# Patient Record
Sex: FEMALE | Race: BLACK OR AFRICAN AMERICAN | Employment: FULL TIME | URBAN - METROPOLITAN AREA
[De-identification: names, ages, dates, MRNs, and addresses within clinical notes are randomized per-mention and may not be internally consistent; named-entity substitution may affect disease eponyms.]

---

## 2017-02-27 ENCOUNTER — HOSPITAL ENCOUNTER (EMERGENCY)
Age: 27
Discharge: HOME | End: 2017-02-27
Attending: EMERGENCY MEDICINE | Admitting: EMERGENCY MEDICINE
Payer: COMMERCIAL

## 2017-02-27 VITALS
BODY MASS INDEX: 32.02 KG/M2 | OXYGEN SATURATION: 99 % | HEIGHT: 67 IN | WEIGHT: 204 LBS | TEMPERATURE: 98.5 F | RESPIRATION RATE: 18 BRPM | SYSTOLIC BLOOD PRESSURE: 128 MMHG | DIASTOLIC BLOOD PRESSURE: 76 MMHG | HEART RATE: 68 BPM

## 2017-02-27 DIAGNOSIS — S61.219A LACERATION OF FINGER, INITIAL ENCOUNTER: Primary | ICD-10-CM

## 2017-02-27 PROCEDURE — 74011000250 HC RX REV CODE- 250: Performed by: EMERGENCY MEDICINE

## 2017-02-27 PROCEDURE — 77030018836 HC SOL IRR NACL ICUM -A

## 2017-02-27 PROCEDURE — 99282 EMERGENCY DEPT VISIT SF MDM: CPT

## 2017-02-27 PROCEDURE — 75810000293 HC SIMP/SUPERF WND  RPR

## 2017-02-27 PROCEDURE — 77030031132 HC SUT NYL COVD -A

## 2017-02-27 RX ORDER — LIDOCAINE HYDROCHLORIDE 20 MG/ML
10 INJECTION, SOLUTION INFILTRATION; PERINEURAL ONCE
Status: COMPLETED | OUTPATIENT
Start: 2017-02-27 | End: 2017-02-27

## 2017-02-27 RX ADMIN — LIDOCAINE HYDROCHLORIDE 200 MG: 20 INJECTION, SOLUTION INFILTRATION; PERINEURAL at 22:08

## 2017-02-28 NOTE — DISCHARGE INSTRUCTIONS
Cuts Closed With Stitches: Care Instructions  Your Care Instructions  A cut can happen anywhere on your body. The doctor used stitches to close the cut. Using stitches also helps the cut heal and reduces scarring. Sometimes pieces of tape called Steri-Strips are put over the stitches. If the cut went deep and through the skin, the doctor may have put in two layers of stitches. The deeper layer brings the deep part of the cut together. These stitches will dissolve and don't need to be removed. The stitches in the upper layer are the ones you see on the cut. You will probably have a bandage over the stitches. You will need to have the stitches removed, usually in 10 to 14 days. The doctor has checked you carefully, but problems can develop later. If you notice any problems or new symptoms, get medical treatment right away. Follow-up care is a key part of your treatment and safety. Be sure to make and go to all appointments, and call your doctor if you are having problems. It's also a good idea to know your test results and keep a list of the medicines you take. How can you care for yourself at home? · Keep the cut dry for the first 24 to 48 hours. After this, you can shower if your doctor okays it. Pat the cut dry. · Don't soak the cut, such as in a bathtub. Your doctor will tell you when it's safe to get the cut wet. · If your doctor told you how to care for your cut, follow your doctor's instructions. If you did not get instructions, follow this general advice:  ¨ After the first 24 to 48 hours, wash around the cut with clean water 2 times a day. Don't use hydrogen peroxide or alcohol, which can slow healing. ¨ You may cover the cut with a thin layer of petroleum jelly, such as Vaseline, and a nonstick bandage. ¨ Apply more petroleum jelly and replace the bandage as needed. · Prop up the sore area on a pillow anytime you sit or lie down during the next 3 days.  Try to keep it above the level of your heart. This will help reduce swelling. · Avoid any activity that could cause your cut to reopen. · Do not remove the stitches on your own. Your doctor will tell you when to come back to have the stitches removed. · Leave Steri-Strips on until they fall off. · Be safe with medicines. Read and follow all instructions on the label. ¨ If the doctor gave you a prescription medicine for pain, take it as prescribed. ¨ If you are not taking a prescription pain medicine, ask your doctor if you can take an over-the-counter medicine. When should you call for help? Call your doctor now or seek immediate medical care if:  · You have new pain, or your pain gets worse. · The skin near the cut is cold or pale or changes color. · You have tingling, weakness, or numbness near the cut. · The cut starts to bleed, and blood soaks through the bandage. Oozing small amounts of blood is normal.  · You have trouble moving the area near the cut. · You have symptoms of infection, such as:  ¨ Increased pain, swelling, warmth, or redness around the cut. ¨ Red streaks leading from the cut. ¨ Pus draining from the cut. ¨ A fever. Watch closely for changes in your health, and be sure to contact your doctor if:  · The cut reopens. · You do not get better as expected. Where can you learn more? Go to http://danelle-pk.info/. Enter R217 in the search box to learn more about \"Cuts Closed With Stitches: Care Instructions. \"  Current as of: May 27, 2016  Content Version: 11.1  © 9335-7618 [employer]. Care instructions adapted under license by [employer] (which disclaims liability or warranty for this information). If you have questions about a medical condition or this instruction, always ask your healthcare professional. [address] any warranty or liability for your use of this information.

## 2017-02-28 NOTE — ED NOTES
Discharge Instructions Reviewed with patient per this nurse. Discharge instructions given to patient per this nurse. Patient able to return verbalize discharge instructions. Paper copy of discharge instructions given. No RX given. Patient condition stable, Respiratory status WNL, Neurostatus intact. Ambulatory out of er, to home with sig other.

## 2017-02-28 NOTE — ED PROVIDER NOTES
HPI Comments: Lashae Ng is a 23 y.o. female with PMHx significant for anxiety,psoriasis who presents ambulatory to the ED with cc of laceration to the left thumb x today, rated a constant 7/10 in terms of pain. She did not specify how she got the laceration. Pt notes that her Tetanus shot is UTD (within the last year) because she needed one for work and school. She notes a h/o psoriasis and reports that she used steroids to control it \"years ago\" but denies having used anything recently. She reports her period started today. She specifically denies any fevers, chills, nausea, vomiting, chest pain, shortness of breath, headache,  diarrhea,. PCP: Brandon Watson PA-C    Social hx: smoking (-) EtOH (-)    There are no other complaints, changes, or physical findings at this time. The history is provided by the patient. Past Medical History:   Diagnosis Date    ADD (attention deficit disorder)     Allergic rhinitis     Anxiety     Chronic UTI     Hearing loss     dx as a child - never treated. No past surgical history on file. 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 Son is 2 yo in 2015. Lives with son and son's father - dating x 6 years in 2015.           ALLERGIES: Review of patient's allergies indicates no known allergies. Review of Systems   Constitutional: Negative for appetite change, chills and fever. HENT: Negative for congestion. Eyes: Negative for visual disturbance. Respiratory: Negative for cough, shortness of breath and wheezing. Cardiovascular: Negative for chest pain, palpitations and leg swelling. Gastrointestinal: Negative for abdominal pain. Genitourinary: Negative for dysuria, frequency and urgency. Musculoskeletal: Negative for back pain, joint swelling, myalgias and neck stiffness. Skin: Positive for wound. Negative for rash. Neurological: Negative for dizziness, syncope, weakness and headaches. Hematological: Negative for adenopathy. Psychiatric/Behavioral: Negative for behavioral problems and dysphoric mood. Vitals:    02/27/17 2151   BP: 128/76   Pulse: 68   Resp: 18   Temp: 98.5 °F (36.9 °C)   SpO2: 99%   Weight: 92.5 kg (204 lb)   Height: 5' 7\" (1.702 m)            Physical Exam   Constitutional: She is oriented to person, place, and time. She appears well-developed and well-nourished. No distress. HENT:   Head: Normocephalic and atraumatic. Mouth/Throat: Oropharynx is clear and moist. No oropharyngeal exudate or posterior oropharyngeal erythema. Neck: Normal range of motion and full passive range of motion without pain. Neck supple. Cardiovascular: Normal rate, regular rhythm, normal heart sounds, intact distal pulses and normal pulses. Exam reveals no gallop and no friction rub. No murmur heard. Pulmonary/Chest: Effort normal and breath sounds normal. No accessory muscle usage. No respiratory distress. She has no decreased breath sounds. She has no wheezes. She has no rhonchi. She has no rales. Abdominal: Soft. Bowel sounds are normal. She exhibits no distension. There is no tenderness. There is no rebound, no guarding and no CVA tenderness. Musculoskeletal: Normal range of motion.  She exhibits no edema or tenderness. Thoracic back: She exhibits no tenderness and no bony tenderness. Lumbar back: She exhibits no tenderness and no bony tenderness. Lymphadenopathy:     She has no cervical adenopathy. Neurological: She is alert and oriented to person, place, and time. She has normal strength. She is not disoriented. No cranial nerve deficit or sensory deficit. No focal deficits; 5/5 muscle strength in all extremities   Skin: Skin is warm. No lesion and no rash noted. Rash is not nodular. She is not diaphoretic. 1.6 cm curvilinear laceration 2-3 mm deep   Nursing note and vitals reviewed. MDM  Number of Diagnoses or Management Options  Diagnosis management comments: Ddx: laceration       Amount and/or Complexity of Data Reviewed  Review and summarize past medical records: yes    Patient Progress  Patient progress: stable    ED Course       Procedures  Procedure Note - Laceration Repair:  10:14 PM  Procedure by Meagan Haddad MD  Complexity: Simple  1.6 cm curvilinear to left thumb  was irrigated copiously with NS under jet lavage, prepped with saline and draped in a sterile fashion. The area was anesthetized with 3  mLs of  Lidocaine 2% without epinephrine via local infiltration. The wound was explored with the following results: No foreign bodies found. The wound was repaired with One layer suture closure: Skin Layer:  5 sutures placed, stitch type:simple interrupted, suture: 5-0 nylon. .  The wound was closed with good hemostasis and approximation. Sterile dressing applied. Estimated blood loss: .5 mL  The procedure took 1-15 minutes, and pt tolerated well. Written by Linda Nath ED Scribe, as dictated by Meagan Haddad MD.          MEDICATIONS GIVEN:  Medications   lidocaine (XYLOCAINE) 20 mg/mL (2 %) injection 200 mg (200 mg IntraDERMal Given by Provider 2/27/17 1628)       IMPRESSION:  1. Laceration of finger, initial encounter        PLAN:  1.    Current Discharge Medication List      CONTINUE these medications which have NOT CHANGED    Details   levonorgestrel (MIRENA) 20 mcg/24 hr (5 years) IUD 1 Each by IntraUTERine route once. methylphenidate (RITALIN) 10 mg tablet 10 mg. FLUoxetine (PROZAC) 10 mg capsule Take 10 mg by mouth three (3) times daily. LORazepam (ATIVAN) 0.5 mg tablet Take  by mouth. 2.   Follow-up Information     Follow up With Details Comments [address], PA-C   [address] [facility] [address] [phone]          Return to ED if worse     DISCHARGE NOTE  10:24 PM  The patient has been re-evaluated and is ready for discharge. Reviewed available results with patient. Counseled pt on diagnosis and care plan. Pt has expressed understanding, and all questions have been answered. Pt agrees with plan and agrees to F/U as recommended, or return to the ED if their sxs worsen. Discharge instructions have been provided and explained to the pt, along with reasons to return to the ED. Written by Desirae Holloway, ED Scribe, as dictated by Rene Rankin MD.    This note is prepared by Desirae Holloway, acting as Scribe for Rene Rankin MD.    Rene Rankin MD: The scribe's documentation has been prepared under my direction and personally reviewed by me in its entirety. I confirm that the note above accurately reflects all work, treatment, procedures, and medical decision making performed by me.

## 2017-02-28 NOTE — ED NOTES
Emergency Department Nursing Plan of Care       The Nursing Plan of Care is developed from the Nursing assessment and Emergency Department Attending provider initial evaluation. The plan of care may be reviewed in the ED Provider note.     The Plan of Care was developed with the following considerations:   Patient / Family readiness to learn indicated by:verbalized understanding  Persons(s) to be included in education: patient  Barriers to Learning/Limitations:No    Signed     Yudelka Catalan RN    2/27/2017   10:14 PM

## 2017-03-13 ENCOUNTER — OFFICE VISIT (OUTPATIENT)
Dept: INTERNAL MEDICINE CLINIC | Facility: CLINIC | Age: 27
End: 2017-03-13

## 2017-03-13 VITALS
BODY MASS INDEX: 31.86 KG/M2 | WEIGHT: 203 LBS | TEMPERATURE: 98.2 F | DIASTOLIC BLOOD PRESSURE: 61 MMHG | SYSTOLIC BLOOD PRESSURE: 127 MMHG | HEIGHT: 67 IN | RESPIRATION RATE: 18 BRPM | HEART RATE: 72 BPM

## 2017-03-13 DIAGNOSIS — Z77.21 EXPOSURE TO POTENTIALLY HAZARDOUS BODY FLUIDS: ICD-10-CM

## 2017-03-13 DIAGNOSIS — S61.012D LACERATION OF LEFT THUMB, SUBSEQUENT ENCOUNTER: Primary | ICD-10-CM

## 2017-03-13 NOTE — MR AVS SNAPSHOT
Visit Information Date & Time Provider Department Dept. Phone Encounter #  
 3/13/2017  1:15 PM Sp Zapata PA-C [facility] [phone] [id number] Follow-up Instructions Return if symptoms worsen or fail to improve. Upcoming Health Maintenance Date Due  
 PAP AKA CERVICAL CYTOLOGY 8/26/2012 HPV AGE 9Y-26Y (2 of 3 - Female 3 Dose Series) 12/28/2016 DTaP/Tdap/Td series (2 - Td) 8/31/2025 Allergies as of 3/13/2017  Review Complete On: 3/13/2017 By: Sp Zapata PA-C No Known Allergies Current Immunizations  Never Reviewed Name Date HPV (Quad) 11/2/2016 Tdap 8/31/2015 Not reviewed this visit You Were Diagnosed With   
  
 Codes Comments Laceration of left thumb, subsequent encounter    -  Primary ICD-10-CM: Z26.163V ICD-9-CM: V58.89, 883.0 Exposure to potentially hazardous body fluids     ICD-10-CM: Z77.21 
ICD-9-CM: V15.85 Vitals BP Pulse Temp Resp Height(growth percentile) Weight(growth percentile) 127/61 (BP 1 Location: Right arm, BP Patient Position: Sitting) 72 98.2 °F (36.8 °C) (Oral) 18 5' 7\" (1.702 m) 203 lb (92.1 kg) BMI OB Status Smoking Status 31.79 kg/m2 IUD Never Smoker Vitals History BMI and BSA Data Body Mass Index Body Surface Area 31.79 kg/m 2 2.09 m 2 Preferred Pharmacy Pharmacy Name Phone [facility] [address] [facility] [phone] Your Updated Medication List  
  
   
This list is accurate as of: 3/13/17  1:30 PM.  Always use your most recent med list.  
  
  
  
  
 LORazepam 0.5 mg tablet Commonly known as:  ATIVAN Take  by mouth.  
  
 methylphenidate 10 mg tablet Commonly known as:  RITALIN  
10 mg. MIRENA 20 mcg/24 hr (5 years) IUD Generic drug:  levonorgestrel 1 Each by IntraUTERine route once. PROzac 10 mg capsule Generic drug:  FLUoxetine Take 10 mg by mouth three (3) times daily. Follow-up Instructions Return if symptoms worsen or fail to improve. Introducing \[facility]\"" SERVICES! Lou Arndt introduces [employer] patient portal. Now you can access parts of your medical record, email your doctor's office, and request medication refills online. 1. In your internet browser, go to [email or web address] 2. Click on the First Time User? Click Here link in the Sign In box. You will see the New Member Sign Up page. 3. Enter your [employer] Access Code exactly as it appears below. You will not need to use this code after youve completed the sign-up process. If you do not sign up before the expiration date, you must request a new code. · [employer] Access Code: [email or web address] Expires: 5/28/2017 10:55 PM 
 
4. Enter the last four digits of your Social Security Number (xxxx) and Date of Birth (mm/dd/yyyy) as indicated and click Submit. You will be taken to the next sign-up page. 5. Create a [employer] ID. This will be your [employer] login ID and cannot be changed, so think of one that is secure and easy to remember. 6. Create a [employer] password. You can change your password at any time. 7. Enter your Password Reset Question and Answer. This can be used at a later time if you forget your password. 8. Enter your e-mail address. You will receive e-mail notification when new information is available in [address] 9. Click Sign Up. You can now view and download portions of your medical record. 10. Click the Download Summary menu link to download a portable copy of your medical information. If you have questions, please visit the Frequently Asked Questions section of the [employer] website. Remember, [employer] is NOT to be used for urgent needs. For medical emergencies, dial 911. Now available from your iPhone and Android! Please provide this summary of care documentation to your next provider. Your primary care clinician is listed as uSsannah Su. If you have any questions after today's visit, please call [phone].

## 2017-03-13 NOTE — PROGRESS NOTES
Chief Complaint   Patient presents with    Suture Removal     thumb on left hand. Sutures have come out on there own, would like the provider to look at area. .1. Have you been to the ER, urgent care clinic since your last visit? Hospitalized since your last visit? Yes When: 2/27/17 Where: [facility] Reason for visit: Cut thumb on left hand needed stitches. 2. Have you seen or consulted any other health care providers outside of the [address] [place] [address] since your last visit? Include any pap smears or colon screening.  No

## 2017-03-13 NOTE — PROGRESS NOTES
HISTORY OF PRESENT ILLNESS  Obed Prado is a 23 y.o. female. HPI  1) L thumb laceration - hand vs kitchen slicer 2 weeks ago. Healing. Stitches fell out and would like a check to be sure healing appropriately. TDAP done 2015.     2) Boyfriend tested positive for Herpes. Got results this morning. He is very upset. Pt has never had a herpes flare. She has tested positive on labs when pregnant with her last child (7 months old). Has been with boyfriend for ~ 7 years. Boyfriend had another partner last year, but none since then. Pt would like resources for more information. Review of Systems   Constitutional: Negative for fever. Skin: Negative for rash. Physical Exam   Constitutional: She is oriented to person, place, and time. She appears well-developed and well-nourished. No distress. HENT:   Head: Normocephalic and atraumatic. Neck: Neck supple. No JVD present. Cardiovascular: Normal rate, regular rhythm and normal heart sounds. Pulmonary/Chest: Effort normal and breath sounds normal. No respiratory distress. Musculoskeletal: She exhibits no edema. Neurological: She is alert and oriented to person, place, and time. Skin: Skin is warm and dry. L thumb with shallow laceration - no remaining sutures present. Healing nicely. Dry skin. Psychiatric: Her behavior is normal. Judgment and thought content normal.   Slightly tearful at times during history. Nursing note and vitals reviewed. ASSESSMENT and PLAN    ICD-10-CM ICD-9-CM    1. Laceration of left thumb, subsequent encounter S61.012D V58.89 Moisturize regularly. Reassured pt this is healing nicely. 883.0    2. Exposure to potentially hazardous body fluids Z77.21 V15.85 Discussed information on chronic HSV II and preventative measures.

## 2017-03-17 ENCOUNTER — CLINICAL SUPPORT (OUTPATIENT)
Dept: INTERNAL MEDICINE CLINIC | Facility: CLINIC | Age: 27
End: 2017-03-17

## 2017-03-17 DIAGNOSIS — Z23 ENCOUNTER FOR IMMUNIZATION: Primary | ICD-10-CM

## 2017-03-17 NOTE — PROGRESS NOTES
Patient in to receive second dose of HPV vaccine. Given in Right deltoid. Tolerated well. No adverse effects observed.  Third dose to be scheduled for [facility] July no sooner than [place]

## 2017-06-20 ENCOUNTER — CLINICAL SUPPORT (OUTPATIENT)
Dept: INTERNAL MEDICINE CLINIC | Facility: CLINIC | Age: 27
End: 2017-06-20

## 2017-06-20 DIAGNOSIS — Z23 ENCOUNTER FOR IMMUNIZATION: Primary | ICD-10-CM

## 2017-06-20 NOTE — PROGRESS NOTES
Patient in today for 3rd dose of HPV. Administered in right deltoid. Tolerated well. Patient observed for 10 minutes. No reactions noted. .  Patient to follow up as needed with provider.

## 2017-11-01 ENCOUNTER — OFFICE VISIT (OUTPATIENT)
Dept: INTERNAL MEDICINE CLINIC | Facility: CLINIC | Age: 27
End: 2017-11-01

## 2017-11-01 VITALS
HEIGHT: 67 IN | BODY MASS INDEX: 30.29 KG/M2 | SYSTOLIC BLOOD PRESSURE: 115 MMHG | WEIGHT: 193 LBS | HEART RATE: 73 BPM | RESPIRATION RATE: 18 BRPM | DIASTOLIC BLOOD PRESSURE: 66 MMHG | TEMPERATURE: 98.4 F

## 2017-11-01 DIAGNOSIS — J02.0 STREP SORE THROAT: Primary | ICD-10-CM

## 2017-11-01 DIAGNOSIS — J02.9 PHARYNGITIS, UNSPECIFIED ETIOLOGY: ICD-10-CM

## 2017-11-01 DIAGNOSIS — H92.02 LEFT EAR PAIN: ICD-10-CM

## 2017-11-01 LAB
S PYO AG THROAT QL: POSITIVE
VALID INTERNAL CONTROL?: YES

## 2017-11-01 RX ORDER — AMOXICILLIN 875 MG/1
875 TABLET, FILM COATED ORAL 2 TIMES DAILY
Qty: 20 TAB | Refills: 0 | Status: SHIPPED | OUTPATIENT
Start: 2017-11-01 | End: 2017-11-14

## 2017-11-01 NOTE — MR AVS SNAPSHOT
Visit Information Date & Time Provider Department Dept. Phone Encounter #  
 11/1/2017 10:30 AM Alyce Burgos NP [facility] Internal Medicine [phone] [id number] Follow-up Instructions Return if symptoms worsen or fail to improve. Your Appointments 11/3/2017  8:15 AM  
PHYSICAL PRE OP with JNONIE [facility]yes [employer] [facility] Appt Note: CPE per pt, $0cp, $0pb, ttw 10/12/17  
 [facility] Upcoming Health Maintenance Date Due  
 PAP AKA CERVICAL CYTOLOGY 8/26/2012 INFLUENZA AGE 9 TO ADULT 8/1/2017 DTaP/Tdap/Td series (2 - Td) 8/31/2025 Allergies as of 11/1/2017  Review Complete On: 11/1/2017 By: Alyce Burgos NP No Known Allergies Current Immunizations  Reviewed on 6/20/2017 Name Date HPV (9-valent) 6/20/2017, 3/17/2017 HPV (Quad) 11/2/2016 Tdap 8/31/2015 Not reviewed this visit You Were Diagnosed With   
  
 Codes Comments Strep sore throat    -  Primary ICD-10-CM: J02.0 ICD-9-CM: 034.0 Pharyngitis, unspecified etiology     ICD-10-CM: J02.9 ICD-9-CM: 055 Left ear pain     ICD-10-CM: H92.02 
ICD-9-CM: 388.70 Vitals BP Pulse Temp Resp Height(growth percentile) Weight(growth percentile) (P) 115/66 (P) 73 (P) 98.4 °F (36.9 °C) (Oral) 18 5' 7\" (1.702 m) 193 lb (87.5 kg) BMI OB Status Smoking Status 30.23 kg/m2 IUD Never Smoker BMI and BSA Data Body Mass Index Body Surface Area  
 30.23 kg/m 2 2.03 m 2 Preferred Pharmacy Pharmacy Name Phone [facility] [address] [facility] [phone] Your Updated Medication List  
  
   
This list is accurate as of: 11/1/17 11:03 AM.  Always use your most recent med list.  
  
  
  
  
 amoxicillin 875 mg tablet Commonly known as:  AMOXIL Take 1 Tab by mouth two (2) times a day. guaiFENesin 1,200 mg Ta12 ER tablet Commonly known as:  Vince & Vince Take 1 Tab by mouth two (2) times a day. LORazepam 0.5 mg tablet Commonly known as:  ATIVAN Take  by mouth.  
  
 methylphenidate HCl 10 mg tablet Commonly known as:  RITALIN  
10 mg. MIRENA 20 mcg/24 hr (5 years) IUD Generic drug:  levonorgestrel 1 Each by IntraUTERine route once. PROzac 10 mg capsule Generic drug:  FLUoxetine Take 10 mg by mouth three (3) times daily. Prescriptions Sent to Pharmacy Refills  
 guaiFENesin (MUCINEX) 1,200 mg Ta12 ER tablet 0 Sig: Take 1 Tab by mouth two (2) times a day. Class: Normal  
 Pharmacy: [address] #: [phone] Route: Oral  
 amoxicillin (AMOXIL) 875 mg tablet 0 Sig: Take 1 Tab by mouth two (2) times a day. Class: Normal  
 Pharmacy: [address] #: [phone] Route: Oral  
  
We Performed the Following AMB POC RAPID STREP A [30479 CPT(R)] Follow-up Instructions Return if symptoms worsen or fail to improve. Patient Instructions Strep Throat: Care Instructions Your Care Instructions Strep throat is a bacterial infection that causes sudden, severe sore throat and fever. Strep throat, which is caused by bacteria called streptococcus, is treated with antibiotics. Sometimes a strep test is necessary to tell if the sore throat is caused by strep bacteria. Treatment can help ease symptoms and may prevent future problems. Follow-up care is a key part of your treatment and safety. Be sure to make and go to all appointments, and call your doctor if you are having problems. It's also a good idea to know your test results and keep a list of the medicines you take. How can you care for yourself at home? · Take your antibiotics as directed. Do not stop taking them just because you feel better. You need to take the full course of antibiotics. · Strep throat can spread to others until 24 hours after you begin taking antibiotics. During this time, you should avoid contact with other people at work or home, especially infants and children. Do not sneeze or cough on others, and wash your hands often. Keep your drinking glass and eating utensils separate from those of others, and wash these items well in hot, soapy water. · Gargle with warm salt water at least once each hour to help reduce swelling and make your throat feel better. Use 1 teaspoon of salt mixed in 8 fluid ounces of warm water. · Take an over-the-counter pain medication, such as acetaminophen (Tylenol), ibuprofen (Advil, Motrin), or naproxen (Aleve). Read and follow all instructions on the label. · Try an over-the-counter anesthetic throat spray or throat lozenges, which may help relieve throat pain. · Drink plenty of fluids. Fluids may help soothe an irritated throat. Hot fluids, such as tea or soup, may help your throat feel better. · Eat soft solids and drink plenty of clear liquids. Flavored ice pops, ice cream, scrambled eggs, sherbet, and gelatin dessert (such as Jell-O) may also soothe the throat. · Get lots of rest. 
· Do not smoke, and avoid secondhand smoke. If you need help quitting, talk to your doctor about stop-smoking programs and medicines. These can increase your chances of quitting for good. · Use a vaporizer or humidifier to add moisture to the air in your bedroom. Follow the directions for cleaning the machine. When should you call for help? Call your doctor now or seek immediate medical care if: 
? · You have a new or higher fever. ? · You have a fever with a stiff neck or severe headache. ? · You have new or worse trouble swallowing. ? · Your sore throat gets much worse on one side. ? · Your pain becomes much worse on one side of your throat. ? Watch closely for changes in your health, and be sure to contact your doctor if: 
? · You are not getting better after 2 days (48 hours). ? · You do not get better as expected. Where can you learn more? Go to http://danelle-pk.info/. Enter K625 in the search box to learn more about \"Strep Throat: Care Instructions. \" Current as of: May 12, 2017 Content Version: 11.4 © 8572-6345 [employer]. Care instructions adapted under license by [employer] (which disclaims liability or warranty for this information). If you have questions about a medical condition or this instruction, always ask your healthcare professional. [address] any warranty or liability for your use of this information. Introducing [facility]! [facility] introduces Aluwave patient portal. Now you can access parts of your medical record, email your doctor's office, and request medication refills online. 1. In your internet browser, go to [email or web address] 2. Click on the First Time User? Click Here link in the Sign In box. You will see the New Member Sign Up page. 3. Enter your Aluwave Access Code exactly as it appears below. You will not need to use this code after youve completed the sign-up process. If you do not sign up before the expiration date, you must request a new code. · Aluwave Access Code: [facility] Expires: 1/30/2018 10:22 AM 
 
4. Enter the last four digits of your Social Security Number (xxxx) and Date of Birth (mm/dd/yyyy) as indicated and click Submit. You will be taken to the next sign-up page. 5. Create a Aluwave ID. This will be your Aluwave login ID and cannot be changed, so think of one that is secure and easy to remember. 6. Create a [email or web address] password. You can change your password at any time. 7. Enter your Password Reset Question and Answer. This can be used at a later time if you forget your password. 8. Enter your e-mail address. You will receive e-mail notification when new information is available in [address] 9. Click Sign Up. You can now view and download portions of your medical record. 10. Click the Download Summary menu link to download a portable copy of your medical information. If you have questions, please visit the Frequently Asked Questions section of the [employer] website. Remember, [employer] is NOT to be used for urgent needs. For medical emergencies, dial 911. Now available from your iPhone and Android! Please provide this summary of care documentation to your next provider. Your primary care clinician is listed as Jazzy Lugo. If you have any questions after today's visit, please call [phone].

## 2017-11-01 NOTE — LETTER
NOTIFICATION RETURN TO WORK / SCHOOL 
 
11/1/2017 11:00 AM 
 
Ms. Kalen Dominguez [address]  [facility] [address] [phone] To Whom It May Concern: 
 
Kalen Dominguez is currently under the care of Yonathan Salcedo. She will return to work/school on: 11/3/17. Please excuse her until then as she is acutely ill. If there are questions or concerns please have the patient contact our office.  
 
 
 
Sincerely, 
 
 
Sorin Charles NP

## 2017-11-01 NOTE — PROGRESS NOTES
Subjective:      Pam Fernandez is a 33 y.o. female who presents today for sore throat. Sore Throat  Patient complains of sore throat, pain rated 8/10. Associated symptoms include sinus and nasal congestion, ear pain to left, and sore throat, dry cough. Onset of symptoms was several days ago, gradually worsening since that time. She is drinking plenty of fluids. She has not had recent close exposure to someone with proven streptococcal pharyngitis. She has taken cold/flu OTC medication. She denies fevers, chills, sweats. Patient Active Problem List    Diagnosis Date Noted    Coccyx pain 11/02/2016    ADD (attention deficit disorder) 08/31/2015    Anxiety 08/31/2015    Allergic rhinitis 08/31/2015    Hearing loss 08/31/2015    Psoriasis 08/31/2015    Frequent UTI 08/31/2015     Current Outpatient Prescriptions   Medication Sig Dispense Refill    levonorgestrel (MIRENA) 20 mcg/24 hr (5 years) IUD 1 Each by IntraUTERine route once.  methylphenidate (RITALIN) 10 mg tablet 10 mg.      FLUoxetine (PROZAC) 10 mg capsule Take 10 mg by mouth three (3) times daily.  LORazepam (ATIVAN) 0.5 mg tablet Take  by mouth. No Known Allergies  Past Medical History:   Diagnosis Date    ADD (attention deficit disorder)     Allergic rhinitis     Anxiety     Chronic UTI     Hearing loss     dx as a child - never treated. Family History   Problem Relation Age of Onset    Adopted: Yes    Drug Abuse Mother      Social History   Substance Use Topics    Smoking status: Never Smoker    Smokeless tobacco: Never Used    Alcohol use No        Review of Systems    A comprehensive review of systems was negative except for that written in the HPI.      Objective:     Visit Vitals    /66    Pulse 73    Temp 98.4 °F (36.9 °C) (Oral)    Resp 18    Ht 5' 7\" (1.702 m)    Wt 193 lb (87.5 kg)    BMI 30.23 kg/m2     General appearance: alert, cooperative, no distress, appears stated age  Head: Normocephalic, without obvious abnormality, atraumatic  Eyes: negative  Ears: abnormal TM AD - serous middle ear fluid, abnormal TM AS - serous middle ear fluid  Nose: Nares normal. Septum midline. Mucosa normal. No drainage or sinus tenderness. Throat: abnormal findings: moderate oropharyngeal erythema, tonsillar hypertrophy 1+ and exudates present  Neck: supple, symmetrical, trachea midline and mild anterior cervical adenopathy  Lungs: clear to auscultation bilaterally  Heart: regular rate and rhythm, S1, S2 normal, no murmur, click, rub or gallop  Neurologic: Grossly normal  Nursing note and vitals reviewed  Assessment/Plan:       ICD-10-CM ICD-9-CM    1. Strep sore throat J02.0 034.0 amoxicillin (AMOXIL) 875 mg tablet   2. Pharyngitis, unspecified etiology J02.9 462 AMB POC RAPID STREP A      guaiFENesin (MUCINEX) 1,200 mg Ta12 ER tablet   3. Left ear pain H92.02 388.70 AMB POC RAPID STREP A      guaiFENesin (MUCINEX) 1,200 mg Ta12 ER tablet     Follow-up Disposition:  Return if symptoms worsen or fail to improve. Advised her to call back or return to office if symptoms worsen/change/persist.  Discussed expected course/resolution/complications of diagnosis in detail with patient. Medication risks/benefits/costs/interactions/alternatives discussed with patient. She was given an after visit summary which includes diagnoses, current medications, & vitals. She expressed understanding with the diagnosis and plan.

## 2017-11-01 NOTE — PATIENT INSTRUCTIONS
Strep Throat: Care Instructions  Your Care Instructions    Strep throat is a bacterial infection that causes sudden, severe sore throat and fever. Strep throat, which is caused by bacteria called streptococcus, is treated with antibiotics. Sometimes a strep test is necessary to tell if the sore throat is caused by strep bacteria. Treatment can help ease symptoms and may prevent future problems. Follow-up care is a key part of your treatment and safety. Be sure to make and go to all appointments, and call your doctor if you are having problems. It's also a good idea to know your test results and keep a list of the medicines you take. How can you care for yourself at home? · Take your antibiotics as directed. Do not stop taking them just because you feel better. You need to take the full course of antibiotics. · Strep throat can spread to others until 24 hours after you begin taking antibiotics. During this time, you should avoid contact with other people at work or home, especially infants and children. Do not sneeze or cough on others, and wash your hands often. Keep your drinking glass and eating utensils separate from those of others, and wash these items well in hot, soapy water. · Gargle with warm salt water at least once each hour to help reduce swelling and make your throat feel better. Use 1 teaspoon of salt mixed in 8 fluid ounces of warm water. · Take an over-the-counter pain medication, such as acetaminophen (Tylenol), ibuprofen (Advil, Motrin), or naproxen (Aleve). Read and follow all instructions on the label. · Try an over-the-counter anesthetic throat spray or throat lozenges, which may help relieve throat pain. · Drink plenty of fluids. Fluids may help soothe an irritated throat. Hot fluids, such as tea or soup, may help your throat feel better. · Eat soft solids and drink plenty of clear liquids.  Flavored ice pops, ice cream, scrambled eggs, sherbet, and gelatin dessert (such as Jell-O) may also soothe the throat. · Get lots of rest.  · Do not smoke, and avoid secondhand smoke. If you need help quitting, talk to your doctor about stop-smoking programs and medicines. These can increase your chances of quitting for good. · Use a vaporizer or humidifier to add moisture to the air in your bedroom. Follow the directions for cleaning the machine. When should you call for help? Call your doctor now or seek immediate medical care if:  ? · You have a new or higher fever. ? · You have a fever with a stiff neck or severe headache. ? · You have new or worse trouble swallowing. ? · Your sore throat gets much worse on one side. ? · Your pain becomes much worse on one side of your throat. ? Watch closely for changes in your health, and be sure to contact your doctor if:  ? · You are not getting better after 2 days (48 hours). ? · You do not get better as expected. Where can you learn more? Go to http://danelle-pk.info/. Enter K625 in the search box to learn more about \"Strep Throat: Care Instructions. \"  Current as of: May 12, 2017  Content Version: 11.4  © 5033-7546 [employer]wise[employer]. Care instructions adapted under license by [employer] (which disclaims liability or warranty for this information). If you have questions about a medical condition or this instruction, always ask your healthcare professional. [address] any warranty or liability for your use of this information.

## 2017-11-14 ENCOUNTER — OFFICE VISIT (OUTPATIENT)
Dept: INTERNAL MEDICINE CLINIC | Facility: CLINIC | Age: 27
End: 2017-11-14

## 2017-11-14 VITALS
DIASTOLIC BLOOD PRESSURE: 75 MMHG | HEIGHT: 67 IN | SYSTOLIC BLOOD PRESSURE: 116 MMHG | BODY MASS INDEX: 29.98 KG/M2 | TEMPERATURE: 97 F | WEIGHT: 191 LBS | RESPIRATION RATE: 18 BRPM | HEART RATE: 78 BPM

## 2017-11-14 DIAGNOSIS — Z00.00 ANNUAL PHYSICAL EXAM: Primary | ICD-10-CM

## 2017-11-14 RX ORDER — MIRTAZAPINE 15 MG/1
TABLET, FILM COATED ORAL
Refills: 0 | COMMUNITY
Start: 2017-08-08 | End: 2018-04-02

## 2017-11-14 RX ORDER — DEXTROAMPHETAMINE SACCHARATE, AMPHETAMINE ASPARTATE, DEXTROAMPHETAMINE SULFATE AND AMPHETAMINE SULFATE 3.75; 3.75; 3.75; 3.75 MG/1; MG/1; MG/1; MG/1
TABLET ORAL
Refills: 0 | COMMUNITY
Start: 2017-08-08 | End: 2019-06-25

## 2017-11-14 NOTE — PATIENT INSTRUCTIONS
Stretching: Exercises  Your Care Instructions  Here are some examples of exercises for stretching. Start each exercise slowly. Ease off the exercise if you start to have pain. Your doctor or physical therapist will tell you when you can start these exercises and which ones will work best for you. How to do the exercises  Latissimus stretch    1. Stand with your back straight and your feet shoulder-width apart. You can do this stretch sitting down if you are not steady on your feet. 2. Hold your arms above your head, and hold one hand with the other. 3. Pull upward while leaning straight over toward your right side. Keep your lower body straight. You should feel the stretch along your left side. 4. Hold 15 to 30 seconds, and then switch sides. 5. Repeat 2 to 4 times for each side. Triceps stretch    1. Stand with your back straight and your feet shoulder-width apart. You can do this stretch sitting down if you are not steady on your feet. 2. Bring your left elbow straight up while bending your arm. 3. Grab your left elbow with your right hand, and pull your left elbow toward your head with light pressure. If you are more flexible, you may pull your arm slightly behind your head. You will feel the stretch along the back of your arm. 4. Hold 15 to 30 seconds, and then switch elbows. 5. Repeat 2 to 4 times for each arm. Calf stretch    1. Place your hands on a wall for balance. You can also do this with your hands on the back of a chair, a countertop, or a tree. 2. Step back with your left leg. Keep the leg straight, and press your left heel into the floor. 3. Press your hips forward, bending your right leg slightly. You will feel the stretch in your left calf. 4. Hold the stretch 15 to 30 seconds. 5. Repeat 2 to 4 times for each leg. Quadriceps stretch    1. Lie on your side with one hand supporting your head. 2. Bend your upper leg back and grab your ankle with your other hand.   3. Stretch your leg back by pulling your foot toward your buttocks. You will feel the stretch in the front of your thigh. If this causes stress on your knees, do not do this stretch. 4. Hold the stretch 15 to 30 seconds. 5. Repeat 2 to 4 times for each leg. Groin stretch    1. Sit on the floor and put the soles of your feet together. Do not slump your back. 2. Grab your ankles and gently pull your legs toward you. 3. Press your knees toward the floor. You will feel the stretch in your inner thighs. 4. Hold 15 to 30 seconds. 5. Repeat 2 to 4 times. Hamstring stretch in doorway    1. Lie on the floor near a doorway, with your buttocks close to the wall. 2. Let the leg you are not stretching extend through the doorway. 3. Put the leg you want to stretch up on the wall, and straighten your knee to feel a gentle stretch at the back of your leg. 4. Hold the stretch for at least 15 to 30 seconds. Repeat 2 to 4 times. Follow-up care is a key part of your treatment and safety. Be sure to make and go to all appointments, and call your doctor if you are having problems. It's also a good idea to know your test results and keep a list of the medicines you take. Where can you learn more? Go to http://danelle-pk.info/. Enter 780 1052 in the search box to learn more about \"Stretching: Exercises. \"  Current as of: March 13, 2017  Content Version: 11.4  © 3032-0011 Healthwise, Incorporated. Care instructions adapted under license by [employer] (which disclaims liability or warranty for this information). If you have questions about a medical condition or this instruction, always ask your healthcare professional. [address] any warranty or liability for your use of this information.

## 2017-11-14 NOTE — MR AVS SNAPSHOT
Visit Information Date & Time Provider Department Dept. Phone Encounter #  
 11/14/2017  8:15 AM Susannah Su PA-C [facility] Internal Medicine [phone] Follow-up Instructions Return in about 1 year (around 11/14/2018) for Physical when convenient. Upcoming Health Maintenance Date Due  
 PAP AKA CERVICAL CYTOLOGY 8/26/2012 DTaP/Tdap/Td series (2 - Td) 8/31/2025 Allergies as of 11/14/2017  Review Complete On: 11/1/2017 By: Sorin Charles NP No Known Allergies Current Immunizations  Reviewed on 6/20/2017 Name Date HPV (9-valent) 6/20/2017, 3/17/2017 HPV (Quad) 11/2/2016 Tdap 8/31/2015 Not reviewed this visit You Were Diagnosed With   
  
 Codes Comments Annual physical exam    -  Primary ICD-10-CM: Z00.00 ICD-9-CM: V70.0 Vitals BP Pulse Temp Resp Height(growth percentile) Weight(growth percentile) 116/75 78 97 °F (36.1 °C) (Oral) 18 5' 7\" (1.702 m) 191 lb (86.6 kg) BMI OB Status Smoking Status 29.91 kg/m2 IUD Never Smoker Vitals History BMI and BSA Data Body Mass Index Body Surface Area  
 29.91 kg/m 2 2.02 m 2 Preferred Pharmacy Pharmacy Name Phone [facility] [address] [place] Liza Zuñiga [phone] Your Updated Medication List  
  
   
This list is accurate as of: 11/14/17  8:42 AM.  Always use your most recent med list.  
  
  
  
  
 dextroamphetamine-amphetamine 15 mg tablet Commonly known as:  ADDERALL  
take 1 tablet by mouth twice a day LORazepam 0.5 mg tablet Commonly known as:  ATIVAN Take  by mouth. MIRENA 20 mcg/24 hr (5 years) Iud  
Generic drug:  levonorgestrel 1 Each by IntraUTERine route once. mirtazapine 15 mg tablet Commonly known as:  REMERON  
take 1 tablet by mouth at bedtime PROzac 10 mg capsule Generic drug:  FLUoxetine Take 10 mg by mouth three (3) times daily. We Performed the Following CBC WITH AUTOMATED DIFF [38604 CPT(R)] HEMOGLOBIN A1C W/O EAG [90440 CPT(R)] LIPID PANEL [14873 CPT(R)] METABOLIC PANEL, COMPREHENSIVE [79786 CPT(R)] TSH REFLEX TO T4 [[id number] Custom] Follow-up Instructions Return in about 1 year (around 11/14/2018) for Physical when convenient. Patient Instructions Stretching: Exercises Your Care Instructions Here are some examples of exercises for stretching. Start each exercise slowly. Ease off the exercise if you start to have pain. Your doctor or physical therapist will tell you when you can start these exercises and which ones will work best for you. How to do the exercises Latissimus stretch 1. Stand with your back straight and your feet shoulder-width apart. You can do this stretch sitting down if you are not steady on your feet. 2. Hold your arms above your head, and hold one hand with the other. 3. Pull upward while leaning straight over toward your right side. Keep your lower body straight. You should feel the stretch along your left side. 4. Hold 15 to 30 seconds, and then switch sides. 5. Repeat 2 to 4 times for each side. Triceps stretch 1. Stand with your back straight and your feet shoulder-width apart. You can do this stretch sitting down if you are not steady on your feet. 2. Bring your left elbow straight up while bending your arm. 3. Grab your left elbow with your right hand, and pull your left elbow toward your head with light pressure. If you are more flexible, you may pull your arm slightly behind your head. You will feel the stretch along the back of your arm. 4. Hold 15 to 30 seconds, and then switch elbows. 5. Repeat 2 to 4 times for each arm. Calf stretch 1. Place your hands on a wall for balance. You can also do this with your hands on the back of a chair, a countertop, or a tree. 2. Step back with your left leg. Keep the leg straight, and press your left heel into the floor. 3. Press your hips forward, bending your right leg slightly. You will feel the stretch in your left calf. 4. Hold the stretch 15 to 30 seconds. 5. Repeat 2 to 4 times for each leg. Quadriceps stretch 1. Lie on your side with one hand supporting your head. 2. Bend your upper leg back and grab your ankle with your other hand. 3. Stretch your leg back by pulling your foot toward your buttocks. You will feel the stretch in the front of your thigh. If this causes stress on your knees, do not do this stretch. 4. Hold the stretch 15 to 30 seconds. 5. Repeat 2 to 4 times for each leg. Groin stretch 1. Sit on the floor and put the soles of your feet together. Do not slump your back. 2. Grab your ankles and gently pull your legs toward you. 3. Press your knees toward the floor. You will feel the stretch in your inner thighs. 4. Hold 15 to 30 seconds. 5. Repeat 2 to 4 times. Hamstring stretch in doorway 1. Lie on the floor near a doorway, with your buttocks close to the wall. 2. Let the leg you are not stretching extend through the doorway. 3. Put the leg you want to stretch up on the wall, and straighten your knee to feel a gentle stretch at the back of your leg. 4. Hold the stretch for at least 15 to 30 seconds. Repeat 2 to 4 times. Follow-up care is a key part of your treatment and safety. Be sure to make and go to all appointments, and call your doctor if you are having problems. It's also a good idea to know your test results and keep a list of the medicines you take. Where can you learn more? Go to http://danelle-pk.info/. Enter 780 [id number] in the search box to learn more about \"Stretching: Exercises. \" Current as of: March 13, 2017 Content Version: 11.4 © 2179-8318 Healthwise, Incorporated.  Care instructions adapted under license by [address] (which disclaims liability or warranty for this information). If you have questions about a medical condition or this instruction, always ask your healthcare professional. [address] any warranty or liability for your use of this information. Introducing [facility] & HEALTH SERVICES! Melecio Rangel introduces Adsvark patient portal. Now you can access parts of your medical record, email your doctor's office, and request medication refills online. 1. In your internet browser, go to [email or web address] 2. Click on the First Time User? Click Here link in the Sign In box. You will see the New Member Sign Up page. 3. Enter your Adsvark Access Code exactly as it appears below. You will not need to use this code after youve completed the sign-up process. If you do not sign up before the expiration date, you must request a new code. · Adsvark Access Code: [facility] Expires: 1/30/2018  9:22 AM 
 
4. Enter the last four digits of your Social Security Number (xxxx) and Date of Birth (mm/dd/yyyy) as indicated and click Submit. You will be taken to the next sign-up page. 5. Create a Adsvark ID. This will be your Adsvark login ID and cannot be changed, so think of one that is secure and easy to remember. 6. Create a Adsvark password. You can change your password at any time. 7. Enter your Password Reset Question and Answer. This can be used at a later time if you forget your password. 8. Enter your e-mail address. You will receive e-mail notification when new information is available in [address] 9. Click Sign Up. You can now view and download portions of your medical record. 10. Click the Download Summary menu link to download a portable copy of your medical information. If you have questions, please visit the Frequently Asked Questions section of the Adsvark website.  Remember, Adsvark is NOT to be used for urgent needs. For medical emergencies, dial 911. Now available from your iPhone and Android! Please provide this summary of care documentation to your next provider. Your primary care clinician is listed as Cris Tyler. If you have any questions after today's visit, please call [phone].

## 2017-11-14 NOTE — PROGRESS NOTES
HISTORY OF PRESENT ILLNESS  Franny Johnson is a 33 y.o. female. Chief Complaint   Patient presents with    Physical     HPI  1) CE. Seeing GYN yearly. Weight is stable. Trying to lose postpartum weight via diet & exercise, but has been busy lately with work/school/moving. Wt Readings from Last 3 Encounters:   11/14/17 191 lb (86.6 kg)   11/01/17 193 lb (87.5 kg)   03/13/17 203 lb (92.1 kg)     Children are doing well. Pt has moved out of the baby's father's home and is living by herself with her children, but she is feeling happy about this change, and her mother comes during the week to help care for pt's children while pt works. Fasting for labs today. Review of Systems   Constitutional: Negative for fever and weight loss. HENT: Negative for congestion, hearing loss and sore throat. Eyes: Negative for blurred vision. Respiratory: Negative for cough and shortness of breath. Cardiovascular: Negative for chest pain, palpitations and leg swelling. Gastrointestinal: Negative for abdominal pain, blood in stool, constipation, diarrhea, heartburn, melena, nausea and vomiting. Genitourinary: Negative for dysuria, frequency, hematuria and urgency. Musculoskeletal: Negative for back pain, joint pain and neck pain. Neurological: Negative for dizziness, seizures, loss of consciousness, weakness and headaches. Endo/Heme/Allergies: Negative for environmental allergies. Psychiatric/Behavioral: Negative for depression and substance abuse. The patient is not nervous/anxious and does not have insomnia. Physical Exam   Constitutional: She is oriented to person, place, and time. She appears well-developed and well-nourished. No distress. HENT:   Head: Normocephalic and atraumatic. Right Ear: External ear normal.   Left Ear: External ear normal.   Nose: Nose normal.   Mouth/Throat: Oropharynx is clear and moist.   Eyes: Conjunctivae are normal.   Neck: Neck supple. No JVD present.  No thyromegaly present. Cardiovascular: Normal rate, regular rhythm and normal heart sounds. Pulmonary/Chest: Effort normal and breath sounds normal. No respiratory distress. Abdominal: Soft. Bowel sounds are normal. She exhibits no distension and no mass. There is no tenderness. There is no rebound and no guarding. Musculoskeletal: She exhibits no edema. Lymphadenopathy:     She has no cervical adenopathy. Neurological: She is alert and oriented to person, place, and time. Skin: Skin is warm and dry. Psychiatric: She has a normal mood and affect. Her behavior is normal. Judgment and thought content normal.   Nursing note and vitals reviewed. ASSESSMENT and PLAN    ICD-10-CM ICD-9-CM    1. Annual physical exam Z00.00 V70.0 CBC WITH AUTOMATED DIFF      METABOLIC PANEL, COMPREHENSIVE      HEMOGLOBIN A1C W/O EAG      LIPID PANEL      TSH REFLEX TO T4   Congratulated pt on making positive changes for mental health and encouraged her plans to start exercising regularly for weight loss.

## 2017-11-14 NOTE — PROGRESS NOTES
Chief Complaint   Patient presents with    Physical       1. Have you been to the ER, urgent care clinic since your last visit? Hospitalized since your last visit? No    2. Have you seen or consulted any other health care providers outside of the [address] [place] [address] since your last visit? Include any pap smears or colon screening.  No

## 2017-11-15 LAB
ALBUMIN SERPL-MCNC: 4.5 G/DL (ref 3.5–5.5)
ALBUMIN/GLOB SERPL: 1.4 {RATIO} (ref 1.2–2.2)
ALP SERPL-CCNC: 54 IU/L (ref 39–117)
ALT SERPL-CCNC: 10 IU/L (ref 0–32)
AST SERPL-CCNC: 16 IU/L (ref 0–40)
BASOPHILS # BLD AUTO: 0 X10E3/UL (ref 0–0.2)
BASOPHILS NFR BLD AUTO: 0 %
BILIRUB SERPL-MCNC: 0.6 MG/DL (ref 0–1.2)
BUN SERPL-MCNC: 13 MG/DL (ref 6–20)
BUN/CREAT SERPL: 19 (ref 9–23)
CALCIUM SERPL-MCNC: 9.3 MG/DL (ref 8.7–10.2)
CHLORIDE SERPL-SCNC: 98 MMOL/L (ref 96–106)
CHOLEST SERPL-MCNC: 188 MG/DL (ref 100–199)
CO2 SERPL-SCNC: 24 MMOL/L (ref 18–29)
CREAT SERPL-MCNC: 0.67 MG/DL (ref 0.57–1)
EOSINOPHIL # BLD AUTO: 0 X10E3/UL (ref 0–0.4)
EOSINOPHIL NFR BLD AUTO: 0 %
ERYTHROCYTE [DISTWIDTH] IN BLOOD BY AUTOMATED COUNT: 13.5 % (ref 12.3–15.4)
GFR SERPLBLD CREATININE-BSD FMLA CKD-EPI: 122 ML/MIN/1.73
GFR SERPLBLD CREATININE-BSD FMLA CKD-EPI: 140 ML/MIN/1.73
GLOBULIN SER CALC-MCNC: 3.2 G/DL (ref 1.5–4.5)
GLUCOSE SERPL-MCNC: 82 MG/DL (ref 65–99)
HBA1C MFR BLD: 5.2 % (ref 4.8–5.6)
HCT VFR BLD AUTO: 40.5 % (ref 34–46.6)
HDLC SERPL-MCNC: 53 MG/DL
HGB BLD-MCNC: 13.6 G/DL (ref 11.1–15.9)
IMM GRANULOCYTES # BLD: 0 X10E3/UL (ref 0–0.1)
IMM GRANULOCYTES NFR BLD: 0 %
LDLC SERPL CALC-MCNC: 124 MG/DL (ref 0–99)
LYMPHOCYTES # BLD AUTO: 2.3 X10E3/UL (ref 0.7–3.1)
LYMPHOCYTES NFR BLD AUTO: 23 %
MCH RBC QN AUTO: 29.9 PG (ref 26.6–33)
MCHC RBC AUTO-ENTMCNC: 33.6 G/DL (ref 31.5–35.7)
MCV RBC AUTO: 89 FL (ref 79–97)
MONOCYTES # BLD AUTO: 0.6 X10E3/UL (ref 0.1–0.9)
MONOCYTES NFR BLD AUTO: 6 %
NEUTROPHILS # BLD AUTO: 7 X10E3/UL (ref 1.4–7)
NEUTROPHILS NFR BLD AUTO: 71 %
PLATELET # BLD AUTO: 331 X10E3/UL (ref 150–379)
POTASSIUM SERPL-SCNC: 4.4 MMOL/L (ref 3.5–5.2)
PROT SERPL-MCNC: 7.7 G/DL (ref 6–8.5)
RBC # BLD AUTO: 4.55 X10E6/UL (ref 3.77–5.28)
SODIUM SERPL-SCNC: 138 MMOL/L (ref 134–144)
TRIGL SERPL-MCNC: 56 MG/DL (ref 0–149)
TSH SERPL DL<=0.005 MIU/L-ACNC: 1.54 UIU/ML (ref 0.45–4.5)
VLDLC SERPL CALC-MCNC: 11 MG/DL (ref 5–40)
WBC # BLD AUTO: 10 X10E3/UL (ref 3.4–10.8)

## 2018-02-08 ENCOUNTER — OFFICE VISIT (OUTPATIENT)
Dept: INTERNAL MEDICINE CLINIC | Facility: CLINIC | Age: 28
End: 2018-02-08

## 2018-02-08 VITALS
BODY MASS INDEX: 29.35 KG/M2 | RESPIRATION RATE: 18 BRPM | HEART RATE: 76 BPM | HEIGHT: 67 IN | SYSTOLIC BLOOD PRESSURE: 125 MMHG | DIASTOLIC BLOOD PRESSURE: 72 MMHG | WEIGHT: 187 LBS | TEMPERATURE: 97.6 F

## 2018-02-08 DIAGNOSIS — N30.00 ACUTE CYSTITIS WITHOUT HEMATURIA: Primary | ICD-10-CM

## 2018-02-08 DIAGNOSIS — N39.0 URINARY TRACT INFECTION WITHOUT HEMATURIA, SITE UNSPECIFIED: ICD-10-CM

## 2018-02-08 DIAGNOSIS — N39.0 FREQUENT UTI: ICD-10-CM

## 2018-02-08 LAB
BILIRUB UR QL STRIP: NEGATIVE
GLUCOSE UR-MCNC: NEGATIVE MG/DL
KETONES P FAST UR STRIP-MCNC: NEGATIVE MG/DL
PH UR STRIP: 6.5 [PH] (ref 4.6–8)
PROT UR QL STRIP: NEGATIVE
SP GR UR STRIP: 1.02 (ref 1–1.03)
UA UROBILINOGEN AMB POC: NORMAL (ref 0.2–1)
URINALYSIS CLARITY POC: CLEAR
URINALYSIS COLOR POC: YELLOW
URINE BLOOD POC: NEGATIVE
URINE LEUKOCYTES POC: NORMAL
URINE NITRITES POC: NEGATIVE

## 2018-02-08 RX ORDER — NITROFURANTOIN 25; 75 MG/1; MG/1
100 CAPSULE ORAL 2 TIMES DAILY
Qty: 14 CAP | Refills: 0 | Status: SHIPPED | OUTPATIENT
Start: 2018-02-08 | End: 2018-04-02

## 2018-02-08 RX ORDER — FLUCONAZOLE 150 MG/1
150 TABLET ORAL
Qty: 2 TAB | Refills: 0 | Status: SHIPPED | OUTPATIENT
Start: 2018-02-08 | End: 2018-02-16

## 2018-02-08 RX ORDER — SULFAMETHOXAZOLE AND TRIMETHOPRIM 400; 80 MG/1; MG/1
TABLET ORAL
Qty: 30 TAB | Refills: 2 | Status: SHIPPED | OUTPATIENT
Start: 2018-02-08 | End: 2018-04-02

## 2018-02-08 NOTE — MR AVS SNAPSHOT
Brandon Quinonez 
 
 
 [facility] 
[phone] Patient: Maci Snowden MRN: [id number] :1990 Visit Information Date & Time Provider Department Dept. Phone Encounter #  
 2018 10:45 AM Ellie Alan PA-C [facility] Internal Medicine [phone] [id number] Upcoming Health Maintenance Date Due  
 PAP AKA CERVICAL CYTOLOGY 2012 DTaP/Tdap/Td series (2 - Td) 2025 Allergies as of 2018  Review Complete On: 2017 By: Neha Gilmore NP No Known Allergies Current Immunizations  Reviewed on 2017 Name Date HPV (9-valent) 2017, 3/17/2017 HPV (Quad) 2016 Tdap 2015 Not reviewed this visit You Were Diagnosed With   
  
 Codes Comments Acute cystitis without hematuria    -  Primary ICD-10-CM: N30.00 ICD-9-CM: 595.0 Frequent UTI     ICD-10-CM: N39.0 ICD-9-CM: 599.0 Urinary tract infection without hematuria, site unspecified     ICD-10-CM: N39.0 ICD-9-CM: 599.0 Vitals BP Pulse Temp Resp Height(growth percentile) Weight(growth percentile) 125/72 76 97.6 °F (36.4 °C) (Oral) 18 5' 7\" (1.702 m) 187 lb (84.8 kg) LMP BMI OB Status Smoking Status 2018 (Exact Date) 29.29 kg/m2 Having regular periods Never Smoker Vitals History BMI and BSA Data Body Mass Index Body Surface Area  
 29.29 kg/m 2 2 m 2 Preferred Pharmacy Pharmacy Name Phone [id number] [phone] Your Updated Medication List  
  
   
This list is accurate as of: 18 11:12 AM.  Always use your most recent med list.  
  
  
  
  
 dextroamphetamine-amphetamine 15 mg tablet Commonly known as:  ADDERALL  
take 1 tablet by mouth twice a day  
  
 fluconazole 150 mg tablet Commonly known as:  DIFLUCAN  
 Take 1 Tab by mouth every seven (7) days for 2 doses. Take one by mouth weekly. LORazepam 0.5 mg tablet Commonly known as:  ATIVAN Take  by mouth. MIRENA 20 mcg/24 hr (5 years) Iud  
Generic drug:  levonorgestrel 1 Each by IntraUTERine route once. mirtazapine 15 mg tablet Commonly known as:  REMERON  
take 1 tablet by mouth at bedtime  
  
 nitrofurantoin (macrocrystal-monohydrate) 100 mg capsule Commonly known as:  MACROBID Take 1 Cap by mouth two (2) times a day. PROzac 10 mg capsule Generic drug:  FLUoxetine Take 10 mg by mouth three (3) times daily. trimethoprim-sulfamethoxazole  mg per tablet Commonly known as:  [employer] Use once after intercourse for UTI prevention. Prescriptions Sent to Pharmacy Refills  
 trimethoprim-sulfamethoxazole (BACTRIM, SEPTRA)  mg per tablet 2 Sig: Use once after intercourse for UTI prevention. Class: Normal  
 Pharmacy: [id number] [place] [address] [facility] Ph #: [phone]  
 nitrofurantoin, macrocrystal-monohydrate, (MACROBID) 100 mg capsule 0 Sig: Take 1 Cap by mouth two (2) times a day. Class: Normal  
 Pharmacy: [place] [address] [facility] Ph #: [phone] Route: Oral  
 fluconazole (DIFLUCAN) 150 mg tablet 0 Sig: Take 1 Tab by mouth every seven (7) days for 2 doses. Take one by mouth weekly. Class: Normal  
 [facility] Ph #: [phone] Route: Oral  
  
We Performed the Following AMB POC URINALYSIS DIP STICK AUTO W/O MICRO [02100 CPT(R)] Introducing [facility] SERVICES! Be Carr introduces Wordy patient portal. Now you can access parts of your medical record, email your doctor's office, and request medication refills online. 1. In your internet browser, go to [email or web address] 2. Click on the First Time User? Click Here link in the Sign In box. You will see the New Member Sign Up page. 3. Enter your BeauCoo Access Code exactly as it appears below. You will not need to use this code after youve completed the sign-up process. If you do not sign up before the expiration date, you must request a new code. · BeauCoo Access Code: [email or web address] Expires: 5/9/2018 11:12 AM 
 
4. Enter the last four digits of your Social Security Number (xxxx) and Date of Birth (mm/dd/yyyy) as indicated and click Submit. You will be taken to the next sign-up page. 5. Create a BeauCoo ID. This will be your BeauCoo login ID and cannot be changed, so think of one that is secure and easy to remember. 6. Create a BeauCoo password. You can change your password at any time. 7. Enter your Password Reset Question and Answer. This can be used at a later time if you forget your password. 8. Enter your e-mail address. You will receive e-mail notification when new information is available in [address] 9. Click Sign Up. You can now view and download portions of your medical record. 10. Click the Download Summary menu link to download a portable copy of your medical information. If you have questions, please visit the Frequently Asked Questions section of the BeauCoo website. Remember, BeauCoo is NOT to be used for urgent needs. For medical emergencies, dial 911. Now available from your iPhone and Android! Please provide this summary of care documentation to your next provider. Your primary care clinician is listed as Kumar Toro. If you have any questions after today's visit, please call [phone].

## 2018-02-08 NOTE — PROGRESS NOTES
HISTORY OF PRESENT ILLNESS  Kev Hess is a 33 y.o. female. Chief Complaint   Patient presents with    UTI     HPI  1) UTI - symptoms x 4 days. Pain/Urg/Freq - getting worse. No back pain or fever. Review of Systems   Constitutional: Negative for fever. Gastrointestinal: Negative for abdominal pain. Genitourinary: Positive for dysuria, frequency and urgency. Negative for flank pain and hematuria. Denies vaginal discharge. Physical Exam   Constitutional: She is oriented to person, place, and time. She appears well-developed and well-nourished. No distress. HENT:   Head: Normocephalic and atraumatic. Neck: Neck supple. No JVD present. Cardiovascular: Normal rate, regular rhythm and normal heart sounds. Pulmonary/Chest: Effort normal and breath sounds normal. No respiratory distress. Genitourinary:   Genitourinary Comments: Bilateral costovertebral angles nontender to palpation. Musculoskeletal: She exhibits no edema. Neurological: She is alert and oriented to person, place, and time. Skin: Skin is warm and dry. Psychiatric: She has a normal mood and affect. Her behavior is normal. Judgment and thought content normal.   Nursing note and vitals reviewed. ASSESSMENT and PLAN    ICD-10-CM ICD-9-CM    1. Acute cystitis without hematuria N30.00 595.0 AMB POC URINALYSIS DIP STICK AUTO W/O MICRO      fluconazole (DIFLUCAN) 150 mg tablet   2. Frequent UTI N39.0 599.0 trimethoprim-sulfamethoxazole (BACTRIM, SEPTRA)  mg per tablet   3.  Urinary tract infection without hematuria, site unspecified N39.0 599.0 nitrofurantoin, macrocrystal-monohydrate, (MACROBID) 100 mg capsule

## 2018-02-08 NOTE — PROGRESS NOTES
Chief Complaint   Patient presents with    UTI     1. Have you been to the ER, urgent care clinic since your last visit? Hospitalized since your last visit? No    2. Have you seen or consulted any other health care providers outside of the [address] [place] [address] since your last visit? Include any pap smears or colon screening.  No

## 2018-04-02 ENCOUNTER — OFFICE VISIT (OUTPATIENT)
Dept: INTERNAL MEDICINE CLINIC | Facility: CLINIC | Age: 28
End: 2018-04-02

## 2018-04-02 VITALS
WEIGHT: 187 LBS | TEMPERATURE: 96.7 F | OXYGEN SATURATION: 97 % | HEIGHT: 67 IN | BODY MASS INDEX: 29.35 KG/M2 | RESPIRATION RATE: 18 BRPM | HEART RATE: 75 BPM | DIASTOLIC BLOOD PRESSURE: 74 MMHG | SYSTOLIC BLOOD PRESSURE: 113 MMHG

## 2018-04-02 DIAGNOSIS — Z11.3 SCREEN FOR STD (SEXUALLY TRANSMITTED DISEASE): ICD-10-CM

## 2018-04-02 DIAGNOSIS — N76.0 ACUTE VAGINITIS: Primary | ICD-10-CM

## 2018-04-02 RX ORDER — METRONIDAZOLE 7.5 MG/G
GEL VAGINAL
Refills: 0 | COMMUNITY
Start: 2018-02-01 | End: 2018-04-02 | Stop reason: SDUPTHER

## 2018-04-02 RX ORDER — FLUCONAZOLE 150 MG/1
150 TABLET ORAL
Qty: 2 TAB | Refills: 0 | Status: SHIPPED | OUTPATIENT
Start: 2018-04-02 | End: 2018-04-10

## 2018-04-02 RX ORDER — METRONIDAZOLE 7.5 MG/G
1 GEL VAGINAL DAILY
Qty: 1 TUBE | Refills: 0 | Status: SHIPPED | OUTPATIENT
Start: 2018-04-02 | End: 2018-04-07

## 2018-04-02 RX ORDER — NORETHINDRONE ACETATE AND ETHINYL ESTRADIOL .03; 1.5 MG/1; MG/1
TABLET ORAL
COMMUNITY
End: 2018-06-04 | Stop reason: ALTCHOICE

## 2018-04-02 NOTE — PROGRESS NOTES
Chief Complaint   Patient presents with    Vaginal Discharge     Patient desires to be checked for STD. Room 7     1. Have you been to the ER, urgent care clinic since your last visit? Hospitalized since your last visit? No    2. Have you seen or consulted any other health care providers outside of the [address] [place] [address] since your last visit? Include any pap smears or colon screening.  No     Health Maintenance Due   Topic Date Due    PAP AKA CERVICAL CYTOLOGY  08/26/2012

## 2018-04-02 NOTE — PROGRESS NOTES
HISTORY OF PRESENT ILLNESS  Antoinette Copeland is a 33 y.o. female. Chief Complaint   Patient presents with    Vaginal Discharge     Patient desires to be checked for STD. Room 7     HPI  1) Hx recurrent BV/Yeast. Has seen GYN multiple times for this in the past. Notes that she always requires tx with both Diflucan and Metrogel. Avoids wearing underwear at night/whenever possible. Using [employer] bar soap always. No recent change in sexual partners. No pelvic pain. Review of Systems   Constitutional: Negative for fever and malaise/fatigue. Gastrointestinal: Negative for abdominal pain. Genitourinary: Negative for dysuria, flank pain, frequency, hematuria and urgency. Physical Exam   Constitutional: She appears well-developed and well-nourished. No distress. Cardiovascular: Normal rate, regular rhythm and normal heart sounds. Pulmonary/Chest: Effort normal and breath sounds normal.   Genitourinary: Uterus normal. Vaginal discharge found. Genitourinary Comments: Vagina red, irritated with small amount of white curdy discharge noted. No odor. No CMT   Skin: Skin is warm and dry. Psychiatric: She has a normal mood and affect. Her behavior is normal. Judgment and thought content normal.       ASSESSMENT and PLAN    ICD-10-CM ICD-9-CM    1.  Acute vaginitis N76.0 616.10 fluconazole (DIFLUCAN) 150 mg tablet      metroNIDAZOLE (METROGEL) 0.75 % vaginal gel      NUSWAB VAGINITIS PLUS   2. Screen for STD (sexually transmitted disease) Z11.3 V74.5 HIV 1/2 AG/AB, 4TH GENERATION,W RFLX CONFIRM      HEPATITIS SCREENING PANEL      T PALLIDUM SCREEN W/REFLEX

## 2018-04-07 LAB
A VAGINAE DNA VAG QL NAA+PROBE: ABNORMAL SCORE
BVAB2 DNA VAG QL NAA+PROBE: ABNORMAL SCORE
C ALBICANS DNA VAG QL NAA+PROBE: POSITIVE
C GLABRATA DNA VAG QL NAA+PROBE: NEGATIVE
C TRACH RRNA SPEC QL NAA+PROBE: POSITIVE
HAV AB SER QL IA: NEGATIVE
HBV CORE AB SERPL QL IA: NEGATIVE
HCV AB S/CO SERPL IA: <0.1 S/CO RATIO (ref 0–0.9)
HCV AB SERPL QL IA: NORMAL
HIV 1+2 AB+HIV1 P24 AG SERPL QL IA: NON REACTIVE
MEGA1 DNA VAG QL NAA+PROBE: ABNORMAL SCORE
N GONORRHOEA RRNA SPEC QL NAA+PROBE: NEGATIVE
T PALLIDUM AB SER QL IA: NEGATIVE
T VAGINALIS RRNA SPEC QL NAA+PROBE: NEGATIVE

## 2018-04-09 ENCOUNTER — TELEPHONE (OUTPATIENT)
Dept: INTERNAL MEDICINE CLINIC | Facility: CLINIC | Age: 28
End: 2018-04-09

## 2018-04-09 DIAGNOSIS — N76.0 BACTERIAL VAGINITIS: ICD-10-CM

## 2018-04-09 DIAGNOSIS — A74.9 CHLAMYDIA: Primary | ICD-10-CM

## 2018-04-09 DIAGNOSIS — B96.89 BACTERIAL VAGINITIS: ICD-10-CM

## 2018-04-09 RX ORDER — METRONIDAZOLE 500 MG/1
500 TABLET ORAL 2 TIMES DAILY
Qty: 14 TAB | Refills: 0 | Status: SHIPPED | OUTPATIENT
Start: 2018-04-09 | End: 2018-04-27 | Stop reason: ALTCHOICE

## 2018-04-09 RX ORDER — AZITHROMYCIN 500 MG/1
1000 TABLET, FILM COATED ORAL
Qty: 2 TAB | Refills: 0 | Status: SHIPPED | OUTPATIENT
Start: 2018-04-09 | End: 2018-04-09

## 2018-04-09 NOTE — PROGRESS NOTES
Fernando Jordan,     Your culture results are positive for Yeast (which we treated with Diflucan), Bacterial Vaginosis, and Chlamydia. Chlamydia is an STD, so please inform any partner you've had since your last normal STD test. The [facility] may contact you to make sure you finished the treatment and informed any partner. I will send in Flagyl to treat the Bacterial Vaginosis, and Azithromycin to treat the Chlamydia. Let me know if you have any questions.    Rene Short

## 2018-04-27 ENCOUNTER — OFFICE VISIT (OUTPATIENT)
Dept: INTERNAL MEDICINE CLINIC | Facility: CLINIC | Age: 28
End: 2018-04-27

## 2018-04-27 VITALS
DIASTOLIC BLOOD PRESSURE: 74 MMHG | RESPIRATION RATE: 18 BRPM | HEIGHT: 67 IN | WEIGHT: 180 LBS | TEMPERATURE: 97 F | SYSTOLIC BLOOD PRESSURE: 125 MMHG | HEART RATE: 69 BPM | BODY MASS INDEX: 28.25 KG/M2

## 2018-04-27 DIAGNOSIS — R35.0 URINARY FREQUENCY: ICD-10-CM

## 2018-04-27 DIAGNOSIS — R82.998 URINE LEUKOCYTES: ICD-10-CM

## 2018-04-27 DIAGNOSIS — N89.8 VAGINAL DISCHARGE: Primary | ICD-10-CM

## 2018-04-27 DIAGNOSIS — B37.31 VAGINAL YEAST INFECTION: ICD-10-CM

## 2018-04-27 LAB
BILIRUB UR QL STRIP: NEGATIVE
GLUCOSE UR-MCNC: NEGATIVE MG/DL
KETONES P FAST UR STRIP-MCNC: NEGATIVE MG/DL
PH UR STRIP: 7.5 [PH] (ref 4.6–8)
PROT UR QL STRIP: NEGATIVE
SP GR UR STRIP: 1.02 (ref 1–1.03)
UA UROBILINOGEN AMB POC: NORMAL (ref 0.2–1)
URINALYSIS CLARITY POC: CLEAR
URINALYSIS COLOR POC: YELLOW
URINE BLOOD POC: NEGATIVE
URINE LEUKOCYTES POC: NORMAL
URINE NITRITES POC: NEGATIVE

## 2018-04-27 RX ORDER — FLUCONAZOLE 150 MG/1
TABLET ORAL
Qty: 2 TAB | Refills: 0 | Status: SHIPPED | OUTPATIENT
Start: 2018-04-27 | End: 2018-06-04 | Stop reason: ALTCHOICE

## 2018-04-27 NOTE — PROGRESS NOTES
Chief Complaint   Patient presents with    Vaginal Discharge     Denies odor, pt request nuswab     1. Have you been to the ER, urgent care clinic since your last visit? Hospitalized since your last visit? No    2. Have you seen or consulted any other health care providers outside of the [facility] since your last visit? Include any pap smears or colon screening.  No

## 2018-04-27 NOTE — PROGRESS NOTES
Subjective:      Antoinette Copeland is a 33 y.o. female who presents today for vaginal complaint. Vaginal complaint: she was diagnosed with yeast, BV, chlaymdia on 4/2 and treated with metronidazole, diflucan, and azithro. She states symptoms of white discharge and urinary frequency have returned. She denies new sexual partners, fevers, abdominal pain. Patient Active Problem List    Diagnosis Date Noted    Coccyx pain 11/02/2016    ADD (attention deficit disorder) 08/31/2015    Anxiety 08/31/2015    Allergic rhinitis 08/31/2015    Hearing loss 08/31/2015    Psoriasis 08/31/2015    Frequent UTI 08/31/2015     Current Outpatient Prescriptions   Medication Sig Dispense Refill    metroNIDAZOLE (FLAGYL) 500 mg tablet Take 1 Tab by mouth two (2) times a day. Avoid alcohol while taking this drug. 14 Tab 0    norethindrone ac-eth estradiol (TRINIDAD 1.5/30, 21,) 1.5-30 mg-mcg tab Take  by mouth.  dextroamphetamine-amphetamine (ADDERALL) 15 mg tablet take 1 tablet by mouth twice a day  0    FLUoxetine (PROZAC) 10 mg capsule Take 10 mg by mouth three (3) times daily.  LORazepam (ATIVAN) 0.5 mg tablet Take  by mouth. No Known Allergies  Past Medical History:   Diagnosis Date    ADD (attention deficit disorder)     Allergic rhinitis     Anxiety     Chronic UTI     Hearing loss     dx as a child - never treated. Family History   Problem Relation Age of Onset    Adopted: Yes    Drug Abuse Mother     Stroke Father 61     Social History   Substance Use Topics    Smoking status: Never Smoker    Smokeless tobacco: Never Used    Alcohol use 0.0 oz/week     0 Standard drinks or equivalent per week      Comment: 2x/month 1-2 drinks         Review of Systems    A comprehensive review of systems was negative except for that written in the HPI.      Objective:     Visit Vitals    /74    Pulse 69    Temp 97 °F (36.1 °C) (Oral)    Resp 18    Ht 5' 7\" (1.702 m)    Wt 180 lb (81.6 kg)  LMP 04/15/2018 (Approximate)    BMI 28.19 kg/m2     General appearance: alert, cooperative, no distress, appears stated age  Head: Normocephalic, without obvious abnormality, atraumatic  Eyes: negative  Lungs: clear to auscultation bilaterally  Heart: regular rate and rhythm, S1, S2 normal, no murmur, click, rub or gallop  Pelvic: external vagina normal, white discharge noted, no inflammation  Neurologic: Grossly normal  Nursing note and vitals reviewed  Assessment/Plan:       ICD-10-CM ICD-9-CM    1. Vaginal discharge N89.8 623.5 NUSWAB VAGINITIS PLUS   2. Urinary frequency R35.0 788.41 AMB POC URINALYSIS DIP STICK AUTO W/O MICRO      CULTURE, URINE   3. Vaginal yeast infection B37.3 112.1 fluconazole (DIFLUCAN) 150 mg tablet   4. Urine leukocytes R82.99 791.7 CULTURE, URINE   POC UA with trace leuks, will send for culture. Diflucan sent. Follow-up Disposition:  Return if symptoms worsen or fail to improve. Advised her to call back or return to office if symptoms worsen/change/persist.  Discussed expected course/resolution/complications of diagnosis in detail with patient. Medication risks/benefits/costs/interactions/alternatives discussed with patient. She was given an after visit summary which includes diagnoses, current medications, & vitals. She expressed understanding with the diagnosis and plan.

## 2018-05-02 LAB
A VAGINAE DNA VAG QL NAA+PROBE: ABNORMAL SCORE
BACTERIA UR CULT: NORMAL
BVAB2 DNA VAG QL NAA+PROBE: ABNORMAL SCORE
C ALBICANS DNA VAG QL NAA+PROBE: POSITIVE
C GLABRATA DNA VAG QL NAA+PROBE: NEGATIVE
C TRACH RRNA SPEC QL NAA+PROBE: NEGATIVE
MEGA1 DNA VAG QL NAA+PROBE: ABNORMAL SCORE
N GONORRHOEA RRNA SPEC QL NAA+PROBE: NEGATIVE
T VAGINALIS RRNA SPEC QL NAA+PROBE: NEGATIVE

## 2018-06-04 ENCOUNTER — OFFICE VISIT (OUTPATIENT)
Dept: INTERNAL MEDICINE CLINIC | Facility: CLINIC | Age: 28
End: 2018-06-04

## 2018-06-04 VITALS
WEIGHT: 178 LBS | HEART RATE: 82 BPM | SYSTOLIC BLOOD PRESSURE: 122 MMHG | DIASTOLIC BLOOD PRESSURE: 75 MMHG | RESPIRATION RATE: 18 BRPM | BODY MASS INDEX: 27.94 KG/M2 | TEMPERATURE: 98.3 F | HEIGHT: 67 IN

## 2018-06-04 DIAGNOSIS — N30.90 CYSTITIS: ICD-10-CM

## 2018-06-04 DIAGNOSIS — N89.8 VAGINAL DISCHARGE: ICD-10-CM

## 2018-06-04 DIAGNOSIS — R30.0 DYSURIA: Primary | ICD-10-CM

## 2018-06-04 DIAGNOSIS — E66.3 OVERWEIGHT (BMI 25.0-29.9): ICD-10-CM

## 2018-06-04 LAB
BILIRUB UR QL STRIP: NEGATIVE
GLUCOSE UR-MCNC: NEGATIVE MG/DL
KETONES P FAST UR STRIP-MCNC: NEGATIVE MG/DL
PH UR STRIP: 7 [PH] (ref 4.6–8)
PROT UR QL STRIP: NORMAL
SP GR UR STRIP: 1.02 (ref 1–1.03)
UA UROBILINOGEN AMB POC: NORMAL (ref 0.2–1)
URINALYSIS CLARITY POC: CLEAR
URINALYSIS COLOR POC: YELLOW
URINE BLOOD POC: NEGATIVE
URINE LEUKOCYTES POC: NORMAL
URINE NITRITES POC: NEGATIVE

## 2018-06-04 RX ORDER — NORETHINDRONE ACETATE/ETHINYL ESTRADIOL AND FERROUS FUMARATE 1MG-20(21)
1 KIT ORAL DAILY
Refills: 1 | COMMUNITY
Start: 2018-05-14 | End: 2022-02-06

## 2018-06-04 RX ORDER — CEFUROXIME AXETIL 250 MG/1
250 TABLET ORAL 2 TIMES DAILY
COMMUNITY
End: 2019-04-26 | Stop reason: ALTCHOICE

## 2018-06-04 NOTE — PROGRESS NOTES
Chief Complaint   Patient presents with    Bladder Infection     [facility] possible uti was given macrobid, then had to return on the 5/30 was given ceftin still reports bladder still feels weird. 1. Have you been to the ER, urgent care clinic since your last visit? Hospitalized since your last visit? Yes When: 5/18 and 5/30 Where: [facility] Reason for visit: UTI    2. Have you seen or consulted any other health care providers outside of the [address] [place] [address] since your last visit? Include any pap smears or colon screening.  No

## 2018-06-04 NOTE — PROGRESS NOTES
Subjective:      Chana Borjas is a 33 y.o. female who presents today for UTI concern. UTI: [facility] possible [facility]i was given macrobid, then had to return on the 5/30 was given ceftin which she is currently taking. She states she still notes symptoms of urinary urgency, clear vaginal discharge, strong odor. Overall symptoms are improving. She denies fevers, abdominal pain, diarrhea. She states PF has a urine culture pending. She will call them today to find results. She also has pyridium at home that she will start today. Patient Active Problem List    Diagnosis Date Noted    Coccyx pain 11/02/2016    ADD (attention deficit disorder) 08/31/2015    Anxiety 08/31/2015    Allergic rhinitis 08/31/2015    Hearing loss 08/31/2015    Psoriasis 08/31/2015    Frequent UTI 08/31/2015     Current Outpatient Prescriptions   Medication Sig Dispense Refill    cefUROXime (CEFTIN) 250 mg tablet Take 250 mg by mouth two (2) times a day.  dextroamphetamine-amphetamine (ADDERALL) 15 mg tablet take 1 tablet by mouth twice a day  0    LORazepam (ATIVAN) 0.5 mg tablet Take  by mouth.  TRINIDAD FE 1/20, 28, 1 mg-20 mcg (21)/75 mg (7) tab   1     No Known Allergies  Past Medical History:   Diagnosis Date    ADD (attention deficit disorder)     Allergic rhinitis     Anxiety     Chronic UTI     Hearing loss     dx as a child - never treated. Review of Systems    A comprehensive review of systems was negative except for that written in the HPI. Objective:     Visit Vitals    /75    Pulse 82    Temp 98.3 °F (36.8 °C) (Oral)    Resp 18    Ht 5' 7\" (1.702 m)    Wt 178 lb (80.7 kg)    LMP 04/16/2018 (Approximate)    BMI 27.88 kg/m2     General appearance: alert, cooperative, no distress, appears stated age  Head: Normocephalic, without obvious abnormality, atraumatic  Eyes: negative  Back: symmetric, no curvature. ROM normal. No CVA tenderness.   Lungs: clear to auscultation bilaterally  Heart: regular rate and rhythm, S1, S2 normal, no murmur, click, rub or gallop  Abdomen: soft, non-tender. Bowel sounds normal. No masses,  no organomegaly  Extremities: extremities normal, atraumatic, no cyanosis or edema  Neurologic: Grossly normal  Nursing note and vitals reviewed  Assessment/Plan:       ICD-10-CM ICD-9-CM    1. Dysuria R30.0 788.1 AMB POC URINALYSIS DIP STICK AUTO W/O MICRO   2. Cystitis N30.90 595.9 CULTURE, URINE   3. Vaginal discharge N89.8 623.5 CT/NG/T.VAGINALIS AMPLIFICATION   4. Overweight (BMI 25.0-29. 9) E66.3 278.02    Will rule out STD and run culture to confirm this abx is right for her infection. POC UA with 1+ leuks  Follow-up Disposition:  Return if symptoms worsen or fail to improve. Advised her to call back or return to office if symptoms worsen/change/persist.  Discussed expected course/resolution/complications of diagnosis in detail with patient. Medication risks/benefits/costs/interactions/alternatives discussed with patient. She was given an after visit summary which includes diagnoses, current medications, & vitals. She expressed understanding with the diagnosis and plan.

## 2018-06-06 LAB
BACTERIA UR CULT: NO GROWTH
C TRACH RRNA SPEC QL NAA+PROBE: NEGATIVE
N GONORRHOEA RRNA SPEC QL NAA+PROBE: NEGATIVE
T VAGINALIS RRNA SPEC QL NAA+PROBE: NEGATIVE

## 2018-07-19 ENCOUNTER — OFFICE VISIT (OUTPATIENT)
Dept: INTERNAL MEDICINE CLINIC | Facility: CLINIC | Age: 28
End: 2018-07-19

## 2018-07-19 ENCOUNTER — LAB ONLY (OUTPATIENT)
Dept: INTERNAL MEDICINE CLINIC | Facility: CLINIC | Age: 28
End: 2018-07-19

## 2018-07-19 VITALS
RESPIRATION RATE: 18 BRPM | HEIGHT: 67 IN | TEMPERATURE: 97.3 F | DIASTOLIC BLOOD PRESSURE: 71 MMHG | HEART RATE: 96 BPM | BODY MASS INDEX: 26.84 KG/M2 | WEIGHT: 171 LBS | SYSTOLIC BLOOD PRESSURE: 128 MMHG

## 2018-07-19 VITALS — WEIGHT: 171 LBS | BODY MASS INDEX: 26.84 KG/M2 | HEIGHT: 67 IN

## 2018-07-19 DIAGNOSIS — Z11.3 SCREEN FOR STD (SEXUALLY TRANSMITTED DISEASE): Primary | ICD-10-CM

## 2018-07-19 DIAGNOSIS — N89.8 VAGINAL DISCHARGE: ICD-10-CM

## 2018-07-19 NOTE — PATIENT INSTRUCTIONS
PRESCRIPTION REFILL POLICY  Effective 93/11/49    In an effort to ensure that the large volume of prescription refills are processed in the most efficient and expeditious manner, we are asking our patients to assist us by calling your pharmacy for all prescription refills. This will include Mail Order Pharmacies. The pharmacy will contact our office electronically to continue the refill process. Please do not wait until the last minute to call your pharmacy. We require 72 hours (3 days) to fill prescriptions. We also encourage you to call your pharmacy before picking up your prescription to make sure it is ready. With regard to controlled substance refill request (narcotics and many ADD/ADHD treatment prescriptions) and any other prescriptions that need to be picked up at our office, we ask your cooperation by providing us with at least 72 hours (3 days) notice prior to your need of the prescription. You will need to show a valid ID when picking up your prescription. Anyone delegated by you to  your prescriptions must be listed be listed on you HIPPA authorization form, and show a valid ID. Narcotics will not be refilled on a weekend or on a holiday in which the office is closed. We also encourage an alternative method of refill requests, which is through our medically secure web portal, Lat49. This is an efficient and effective way to communicate your requests directly with the office and provider. Lat49 can be downloaded onto your smartphone as an [place]win. If you are ready to be connected, please review the attached instructions or speak to any of our staff to get you set up right away! Thank you so much for your cooperation. Should you have any questions, please contact our Practice Administrator, Jerod Isidro [phone].

## 2018-07-19 NOTE — PROGRESS NOTES
Subjective:      Pam Fernandez is a 33 y.o. female who presents today for std check. STD screen: watery, yellow discharge noted this past weekend. She states her boyfriend was cheating on her and she is not sure what she has been exposed to. Patient Active Problem List    Diagnosis Date Noted    Overweight (BMI 25.0-29.9) 06/04/2018    Coccyx pain 11/02/2016    ADD (attention deficit disorder) 08/31/2015    Anxiety 08/31/2015    Allergic rhinitis 08/31/2015    Hearing loss 08/31/2015    Psoriasis 08/31/2015    Frequent UTI 08/31/2015     Current Outpatient Prescriptions   Medication Sig Dispense Refill    cefUROXime (CEFTIN) 250 mg tablet Take 250 mg by mouth two (2) times a day.  TRINIDAD FE 1/20, 28, 1 mg-20 mcg (21)/75 mg (7) tab   1    dextroamphetamine-amphetamine (ADDERALL) 15 mg tablet take 1 tablet by mouth twice a day  0    LORazepam (ATIVAN) 0.5 mg tablet Take  by mouth. No Known Allergies  Past Medical History:   Diagnosis Date    ADD (attention deficit disorder)     Allergic rhinitis     Anxiety     Chronic UTI     Hearing loss     dx as a child - never treated. Review of Systems    A comprehensive review of systems was negative except for that written in the HPI.      Objective:     Visit Vitals    /71    Pulse 96    Temp 97.3 °F (36.3 °C) (Oral)    Resp 18    Ht 5' 7\" (1.702 m)    Wt 171 lb (77.6 kg)    BMI 26.78 kg/m2         General appearance: alert, cooperative, no distress, appears stated age  Head: Normocephalic, without obvious abnormality, atraumatic  Eyes: negative  Lungs: clear to auscultation bilaterally  Heart: regular rate and rhythm, S1, S2 normal, no murmur, click, rub or gallop  Pelvic: External genitalia normal, Vagina normal with yellow discharge, whif test positive   Neurologic: Grossly normal  Nursing note and vitals reviewed  Assessment/Plan:       ICD-10-CM ICD-9-CM    1. Screen for STD (sexually transmitted disease) Z11.3 V74.5 HIV 1/2 AG/AB, 4TH GENERATION,W RFLX CONFIRM      RPR W/REFLEX TITER AND TREPONEMA ABS      NUSWAB VAGINITIS PLUS   2. Vaginal discharge N89.8 623.5 NUSWAB VAGINITIS PLUS     Follow-up Disposition:  Return if symptoms worsen or fail to improve. Advised her to call back or return to office if symptoms worsen/change/persist.  Discussed expected course/resolution/complications of diagnosis in detail with patient. Medication risks/benefits/costs/interactions/alternatives discussed with patient. She was given an after visit summary which includes diagnoses, current medications, & vitals. She expressed understanding with the diagnosis and plan.

## 2018-07-19 NOTE — PROGRESS NOTES
Chief Complaint   Patient presents with    Vaginal Discharge     Pt requesting STD testing        1. Have you been to the ER, urgent care clinic since your last visit? Hospitalized since your last visit? No    2. Have you seen or consulted any other health care providers outside of the [address] [place] [address] since your last visit? Include any pap smears or colon screening.  No

## 2018-07-19 NOTE — PROGRESS NOTES
Chief Complaint   Patient presents with    Vaginal Discharge     1. Have you been to the ER, urgent care clinic since your last visit? Hospitalized since your last visit? No    2. Have you seen or consulted any other health care providers outside of the [address] [place] [address] since your last visit? Include any pap smears or colon screening.  No

## 2018-07-27 DIAGNOSIS — A56.8 CHLAMYDIA TRACHOMATIS INFECTION: ICD-10-CM

## 2018-07-27 DIAGNOSIS — N76.0 BV (BACTERIAL VAGINOSIS): Primary | ICD-10-CM

## 2018-07-27 DIAGNOSIS — B96.89 BV (BACTERIAL VAGINOSIS): Primary | ICD-10-CM

## 2018-07-27 DIAGNOSIS — B37.31 VAGINAL YEAST INFECTION: ICD-10-CM

## 2018-07-27 LAB
A VAGINAE DNA VAG QL NAA+PROBE: ABNORMAL SCORE
BVAB2 DNA VAG QL NAA+PROBE: ABNORMAL SCORE
C ALBICANS DNA VAG QL NAA+PROBE: POSITIVE
C GLABRATA DNA VAG QL NAA+PROBE: NEGATIVE
C TRACH RRNA SPEC QL NAA+PROBE: POSITIVE
HIV 1+2 AB+HIV1 P24 AG SERPL QL IA: NON REACTIVE
MEGA1 DNA VAG QL NAA+PROBE: ABNORMAL SCORE
N GONORRHOEA RRNA SPEC QL NAA+PROBE: NEGATIVE
RPR SER QL: NON REACTIVE
T VAGINALIS RRNA SPEC QL NAA+PROBE: NEGATIVE

## 2018-07-27 RX ORDER — METRONIDAZOLE 500 MG/1
500 TABLET ORAL 2 TIMES DAILY
Qty: 14 TAB | Refills: 0 | Status: SHIPPED | OUTPATIENT
Start: 2018-07-27 | End: 2019-04-26 | Stop reason: ALTCHOICE

## 2018-07-27 RX ORDER — AZITHROMYCIN 500 MG/1
1000 TABLET, FILM COATED ORAL DAILY
Qty: 2 TAB | Refills: 0 | Status: SHIPPED | OUTPATIENT
Start: 2018-07-27 | End: 2019-04-26 | Stop reason: ALTCHOICE

## 2018-07-27 RX ORDER — FLUCONAZOLE 150 MG/1
TABLET ORAL
Qty: 2 TAB | Refills: 0 | Status: SHIPPED | OUTPATIENT
Start: 2018-07-27 | End: 2019-04-26 | Stop reason: ALTCHOICE

## 2018-07-27 NOTE — PROGRESS NOTES
Per NP Skiff, pt informed that her vaginal swab was positive for BV, yeast and Chlamydia. Pt informed to alert all of her sexual partners so they can be treated. Informed pt that medications were sent to her pharmacy. Diflucan and Azithromycin should not be taken on the same day. Pt instructed to wait until Monday to take the Diflucan. Pt voiced understanding.  Danika Ashrfa LPN

## 2019-04-26 ENCOUNTER — OFFICE VISIT (OUTPATIENT)
Dept: INTERNAL MEDICINE CLINIC | Facility: CLINIC | Age: 29
End: 2019-04-26

## 2019-04-26 VITALS
DIASTOLIC BLOOD PRESSURE: 68 MMHG | HEART RATE: 76 BPM | HEIGHT: 67 IN | WEIGHT: 166 LBS | SYSTOLIC BLOOD PRESSURE: 105 MMHG | BODY MASS INDEX: 26.06 KG/M2 | RESPIRATION RATE: 16 BRPM | TEMPERATURE: 98.2 F

## 2019-04-26 DIAGNOSIS — R82.998 URINE LEUKOCYTES: ICD-10-CM

## 2019-04-26 DIAGNOSIS — Z11.3 SCREEN FOR STD (SEXUALLY TRANSMITTED DISEASE): ICD-10-CM

## 2019-04-26 DIAGNOSIS — R35.0 URINARY FREQUENCY: Primary | ICD-10-CM

## 2019-04-26 LAB
BILIRUB UR QL STRIP: NEGATIVE
GLUCOSE UR-MCNC: NEGATIVE MG/DL
KETONES P FAST UR STRIP-MCNC: NEGATIVE MG/DL
PH UR STRIP: 7 [PH] (ref 4.6–8)
PROT UR QL STRIP: NEGATIVE
SP GR UR STRIP: 1.02 (ref 1–1.03)
UA UROBILINOGEN AMB POC: NORMAL (ref 0.2–1)
URINALYSIS CLARITY POC: CLEAR
URINALYSIS COLOR POC: YELLOW
URINE BLOOD POC: NEGATIVE
URINE LEUKOCYTES POC: NORMAL
URINE NITRITES POC: NEGATIVE

## 2019-04-26 NOTE — PROGRESS NOTES
Chief Complaint   Patient presents with    Urgency     was treated a month ago for uti but has completed meds and still feels the same symptoms wants to rule out STI. 1. Have you been to the ER, urgent care clinic since your last visit? Hospitalized since your last visit? Yes When: 1 month ago [facility] Reason for visit: Urinary urgency. 2. Have you seen or consulted any other health care providers outside of the [address] [place] [address] since your last visit? Include any pap smears or colon screening.  No

## 2019-04-26 NOTE — PROGRESS NOTES
Subjective:      Shiraz Israel is a 33 y.o. female who presents today for STI screen. STI screen: she would like screening, she denies symptoms of discharge, abdominal pain, fevers. She notes she had a UTI and was treated with macrobid by [facility]. She notes the urinary frequency has continued despite completing the ABX. She denies back pain, hematuria. She is not taking anything for symptoms at this time. Patient Active Problem List    Diagnosis Date Noted    Overweight (BMI 25.0-29.9) 06/04/2018    Coccyx pain 11/02/2016    ADD (attention deficit disorder) 08/31/2015    Anxiety 08/31/2015    Allergic rhinitis 08/31/2015    Hearing loss 08/31/2015    Psoriasis 08/31/2015    Frequent UTI 08/31/2015     Current Outpatient Medications   Medication Sig Dispense Refill    TRINIDAD FE 1/20, 28, 1 mg-20 mcg (21)/75 mg (7) tab   1    dextroamphetamine-amphetamine (ADDERALL) 15 mg tablet take 1 tablet by mouth twice a day  0    metroNIDAZOLE (FLAGYL) 500 mg tablet Take 1 Tab by mouth two (2) times a day. 14 Tab 0    azithromycin (ZITHROMAX) 500 mg tab Take 2 Tabs by mouth daily. 2 Tab 0    fluconazole (DIFLUCAN) 150 mg tablet Take 1 tab now and another in 3 days. FDA advises cautious prescribing of oral fluconazole in pregnancy. 2 Tab 0    cefUROXime (CEFTIN) 250 mg tablet Take 250 mg by mouth two (2) times a day.  LORazepam (ATIVAN) 0.5 mg tablet Take  by mouth. No Known Allergies  Past Medical History:   Diagnosis Date    ADD (attention deficit disorder)     Allergic rhinitis     Anxiety     Chronic UTI     Hearing loss     dx as a child - never treated. History reviewed. No pertinent surgical history. Review of Systems    A comprehensive review of systems was negative except for that written in the HPI.      Objective:     Visit Vitals  /68   Pulse 76   Temp 98.2 °F (36.8 °C) (Oral)   Resp 16   Ht 5' 7\" (1.702 m)   Wt 166 lb (75.3 kg)   LMP 04/20/2019 (Exact Date)   BMI 26.00 kg/m²     General appearance: alert, cooperative, no distress, appears stated age  Head: Normocephalic, without obvious abnormality, atraumatic  Eyes: negative  Back: symmetric, no curvature. ROM normal. No CVA tenderness. Lungs: clear to auscultation bilaterally  Heart: regular rate and rhythm, S1, S2 normal, no murmur, click, rub or gallop  Extremities: extremities normal, atraumatic, no cyanosis or edema  Skin: Skin color, texture, turgor normal. No rashes or lesions  Neurologic: Grossly normal  Nursing note and vitals reviewed  Assessment/Plan:       ICD-10-CM ICD-9-CM    1. Urinary frequency R35.0 788.41 AMB POC URINALYSIS DIP STICK AUTO W/O MICRO   2. Urine leukocytes R82.998 791.7 CULTURE, URINE   3. Screen for STD (sexually transmitted disease) Z11.3 V74.5 CT/NG/T.VAGINALIS AMPLIFICATION   UA shows trace leuks    Follow-up and Dispositions    · Return if symptoms worsen or fail to improve. Advised her to call back or return to office if symptoms worsen/change/persist.  Discussed expected course/resolution/complications of diagnosis in detail with patient. Medication risks/benefits/costs/interactions/alternatives discussed with patient. She was given an after visit summary which includes diagnoses, current medications, & vitals. She expressed understanding with the diagnosis and plan.

## 2019-04-29 DIAGNOSIS — A74.9 CHLAMYDIA: Primary | ICD-10-CM

## 2019-04-29 LAB
C TRACH RRNA SPEC QL NAA+PROBE: POSITIVE
N GONORRHOEA RRNA SPEC QL NAA+PROBE: NEGATIVE
T VAGINALIS RRNA VAG QL NAA+PROBE: NEGATIVE

## 2019-04-29 RX ORDER — AZITHROMYCIN 500 MG/1
1000 TABLET, FILM COATED ORAL
Qty: 2 TAB | Refills: 0 | Status: SHIPPED | OUTPATIENT
Start: 2019-04-29 | End: 2019-04-29

## 2019-04-29 NOTE — PROGRESS NOTES
Called and verified patient did receive mychart message and is aware prescription went to her pharmacy on file. Pt voiced an understanding.  Luis Yadav LPN

## 2019-05-07 DIAGNOSIS — B37.31 VAGINAL YEAST INFECTION: Primary | ICD-10-CM

## 2019-05-07 LAB
BACTERIA UR CULT: NO GROWTH
SPECIMEN STATUS REPORT, ROLRST: NORMAL

## 2019-05-07 RX ORDER — FLUCONAZOLE 150 MG/1
TABLET ORAL
Qty: 2 TAB | Refills: 0 | Status: SHIPPED | OUTPATIENT
Start: 2019-05-07 | End: 2019-06-25 | Stop reason: ALTCHOICE

## 2019-06-25 ENCOUNTER — OFFICE VISIT (OUTPATIENT)
Dept: INTERNAL MEDICINE CLINIC | Facility: CLINIC | Age: 29
End: 2019-06-25

## 2019-06-25 VITALS
BODY MASS INDEX: 24.17 KG/M2 | HEART RATE: 94 BPM | RESPIRATION RATE: 16 BRPM | OXYGEN SATURATION: 100 % | TEMPERATURE: 98.5 F | HEIGHT: 67 IN | WEIGHT: 154 LBS | DIASTOLIC BLOOD PRESSURE: 86 MMHG | SYSTOLIC BLOOD PRESSURE: 123 MMHG

## 2019-06-25 DIAGNOSIS — R82.2 BILIRUBIN IN URINE: ICD-10-CM

## 2019-06-25 DIAGNOSIS — Z11.3 SCREEN FOR STD (SEXUALLY TRANSMITTED DISEASE): Primary | ICD-10-CM

## 2019-06-25 LAB
BILIRUB UR QL STRIP: NORMAL
GLUCOSE UR-MCNC: NEGATIVE MG/DL
KETONES P FAST UR STRIP-MCNC: NORMAL MG/DL
PH UR STRIP: 6 [PH] (ref 4.6–8)
PROT UR QL STRIP: NORMAL
SP GR UR STRIP: 1.03 (ref 1–1.03)
UA UROBILINOGEN AMB POC: NORMAL (ref 0.2–1)
URINALYSIS CLARITY POC: CLEAR
URINALYSIS COLOR POC: NORMAL
URINE BLOOD POC: NORMAL
URINE LEUKOCYTES POC: NORMAL
URINE NITRITES POC: NEGATIVE

## 2019-06-25 RX ORDER — ATOMOXETINE 18 MG/1
CAPSULE ORAL
Refills: 0 | COMMUNITY
Start: 2019-06-14 | End: 2020-12-02 | Stop reason: ALTCHOICE

## 2019-06-25 NOTE — PROGRESS NOTES
Subjective:      Kaylyn Tracy is a 33 y.o. female who presents today for STD testing. STD testing: she has a positive history, she requests screening today. She states her previous boyfriend was sleeping with multiple women, she has since discontinued that relationship. She denies any symptoms at this time. Patient Active Problem List    Diagnosis Date Noted    Overweight (BMI 25.0-29.9) 06/04/2018    Coccyx pain 11/02/2016    ADD (attention deficit disorder) 08/31/2015    Anxiety 08/31/2015    Allergic rhinitis 08/31/2015    Hearing loss 08/31/2015    Psoriasis 08/31/2015    Frequent UTI 08/31/2015     Current Outpatient Medications   Medication Sig Dispense Refill    atomoxetine (STRATTERA) 18 mg capsule take 1 capsule by mouth once daily  0    TRINIDAD FE 1/20, 28, 1 mg-20 mcg (21)/75 mg (7) tab   1     No Known Allergies  Past Medical History:   Diagnosis Date    ADD (attention deficit disorder)     Allergic rhinitis     Anxiety     Chronic UTI     Hearing loss     dx as a child - never treated. History reviewed. No pertinent surgical history. Review of Systems    A comprehensive review of systems was negative except for that written in the HPI.      Objective:     Visit Vitals  /86 (BP 1 Location: Left arm, BP Patient Position: Sitting)   Pulse 94   Temp 98.5 °F (36.9 °C) (Oral)   Resp 16   Ht 5' 7\" (1.702 m)   Wt 154 lb (69.9 kg)   LMP 06/05/2019 (Exact Date)   SpO2 100%   BMI 24.12 kg/m²     General appearance: alert, cooperative, no distress, appears stated age  Head: Normocephalic, without obvious abnormality, atraumatic  Eyes: negative  Lungs: clear to auscultation bilaterally  Heart: regular rate and rhythm, S1, S2 normal, no murmur, click, rub or gallop  Neurologic: Grossly normal  Nursing note and vitals reviewed  Assessment/Plan:       ICD-10-CM ICD-9-CM    1. Screen for STD (sexually transmitted disease) Z11.3 V74.5 CT/NG/T.VAGINALIS AMPLIFICATION      HIV 1/2 AG/AB, 4TH GENERATION,W RFLX CONFIRM      RPR W/REFLEX TITER AND TREPONEMA ABS      HEP B SURFACE AG   2. Bilirubin in urine R82.2 791.4 AMB POC URINALYSIS DIP STICK AUTO W/O MICRO   Will recheck urine in a few weeks. Advised her to call back or return to office if symptoms worsen/change/persist.  Discussed expected course/resolution/complications of diagnosis in detail with patient. Medication risks/benefits/costs/interactions/alternatives discussed with patient. She was given an after visit summary which includes diagnoses, current medications, & vitals. She expressed understanding with the diagnosis and plan.

## 2019-06-25 NOTE — PROGRESS NOTES
Identified pt with two pt identifiers(name and ). Reviewed record in preparation for visit and have obtained necessary documentation. Chief Complaint   Patient presents with    Other     no exposure just wants testing for STI        Health Maintenance Due   Topic    PAP AKA CERVICAL CYTOLOGY        Coordination of Care Questionnaire:  :   1) Have you been to an emergency room, urgent care, or hospitalized since your last visit? If yes, where when, and reason for visit? no     2. Have seen or consulted any other health care provider other than [employer] since your last visit? If yes, where when, and reason for visit? NO    3) Do you have an Advanced Directive/ Living Will in place? NO  If yes, do we have a copy on file NO  If no, would you like information NO    Patient is accompanied by self I have received verbal consent from Alice Whitlock to discuss any/all medical information while they are present in the room.     Visit Vitals  /86 (BP 1 Location: Left arm, BP Patient Position: Sitting)   Pulse 94   Temp 98.5 °F (36.9 °C) (Oral)   Resp 16   Ht 5' 7\" (1.702 m)   Wt 154 lb (69.9 kg)   SpO2 100%   BMI 24.12 kg/m²     [facility], LPN

## 2019-06-26 LAB
C TRACH RRNA SPEC QL NAA+PROBE: NEGATIVE
N GONORRHOEA RRNA SPEC QL NAA+PROBE: NEGATIVE
T VAGINALIS RRNA VAG QL NAA+PROBE: NEGATIVE

## 2019-07-02 DIAGNOSIS — R82.2 BILIRUBIN IN URINE: ICD-10-CM

## 2019-07-04 LAB
APPEARANCE UR: CLEAR
BACTERIA #/AREA URNS HPF: ABNORMAL /[HPF]
BILIRUB UR QL STRIP: NEGATIVE
CASTS URNS QL MICRO: ABNORMAL /LPF
COLOR UR: YELLOW
EPI CELLS #/AREA URNS HPF: >10 /HPF (ref 0–10)
GLUCOSE UR QL: NEGATIVE
HBV SURFACE AG SERPL QL IA: NEGATIVE
HGB UR QL STRIP: NEGATIVE
HIV 1+2 AB+HIV1 P24 AG SERPL QL IA: NON REACTIVE
KETONES UR QL STRIP: NEGATIVE
LEUKOCYTE ESTERASE UR QL STRIP: ABNORMAL
MICRO URNS: ABNORMAL
MUCOUS THREADS URNS QL MICRO: PRESENT
NITRITE UR QL STRIP: POSITIVE
PH UR STRIP: 7.5 [PH] (ref 5–7.5)
PROT UR QL STRIP: NEGATIVE
RBC #/AREA URNS HPF: ABNORMAL /HPF (ref 0–2)
RPR SER QL: NON REACTIVE
SP GR UR: 1.02 (ref 1–1.03)
UROBILINOGEN UR STRIP-MCNC: 1 MG/DL (ref 0.2–1)
WBC #/AREA URNS HPF: ABNORMAL /HPF (ref 0–5)

## 2019-07-05 DIAGNOSIS — Z11.3 SCREEN FOR STD (SEXUALLY TRANSMITTED DISEASE): Primary | ICD-10-CM

## 2019-07-05 DIAGNOSIS — N39.0 URINARY TRACT INFECTION WITHOUT HEMATURIA, SITE UNSPECIFIED: ICD-10-CM

## 2019-07-05 DIAGNOSIS — N30.00 ACUTE CYSTITIS WITHOUT HEMATURIA: Primary | ICD-10-CM

## 2019-07-05 RX ORDER — NITROFURANTOIN 25; 75 MG/1; MG/1
100 CAPSULE ORAL 2 TIMES DAILY
Qty: 14 CAP | Refills: 0 | Status: SHIPPED | OUTPATIENT
Start: 2019-07-05 | End: 2019-08-15 | Stop reason: ALTCHOICE

## 2019-07-05 NOTE — PROGRESS NOTES
STD screen blood levels show NO infection. Urine shows UTI! I am sending antibiotics now. Take this for 7 days and let me know if you have any problems.

## 2019-07-05 NOTE — PROGRESS NOTES
[facility] and test is not able to be added on since specimen is not in correct medium. Jass Cruz DNP notified.   Marguerite Clarke LPN

## 2019-08-15 ENCOUNTER — OFFICE VISIT (OUTPATIENT)
Dept: INTERNAL MEDICINE CLINIC | Facility: CLINIC | Age: 29
End: 2019-08-15

## 2019-08-15 VITALS
WEIGHT: 166 LBS | HEIGHT: 67 IN | SYSTOLIC BLOOD PRESSURE: 111 MMHG | DIASTOLIC BLOOD PRESSURE: 72 MMHG | OXYGEN SATURATION: 99 % | BODY MASS INDEX: 26.06 KG/M2 | TEMPERATURE: 97.7 F | RESPIRATION RATE: 16 BRPM | HEART RATE: 79 BPM

## 2019-08-15 DIAGNOSIS — N30.00 ACUTE CYSTITIS WITHOUT HEMATURIA: Primary | ICD-10-CM

## 2019-08-15 DIAGNOSIS — R30.0 DYSURIA: ICD-10-CM

## 2019-08-15 LAB
BILIRUB UR QL STRIP: NEGATIVE
GLUCOSE UR-MCNC: NEGATIVE MG/DL
KETONES P FAST UR STRIP-MCNC: NEGATIVE MG/DL
PH UR STRIP: 7.5 [PH] (ref 4.6–8)
PROT UR QL STRIP: NEGATIVE
SP GR UR STRIP: 1.01 (ref 1–1.03)
UA UROBILINOGEN AMB POC: NORMAL (ref 0.2–1)
URINALYSIS CLARITY POC: CLEAR
URINALYSIS COLOR POC: YELLOW
URINE BLOOD POC: NEGATIVE
URINE LEUKOCYTES POC: NEGATIVE
URINE NITRITES POC: NEGATIVE

## 2019-08-15 RX ORDER — NITROFURANTOIN 25; 75 MG/1; MG/1
100 CAPSULE ORAL 2 TIMES DAILY
Qty: 14 CAP | Refills: 0 | Status: SHIPPED | OUTPATIENT
Start: 2019-08-15 | End: 2019-09-10 | Stop reason: ALTCHOICE

## 2019-08-15 NOTE — PROGRESS NOTES
Identified pt with two pt identifiers(name and ). Reviewed record in preparation for visit and have obtained necessary documentation. Chief Complaint   Patient presents with    Urinary Frequency     for a few days        Health Maintenance Due   Topic    PAP AKA CERVICAL CYTOLOGY     Influenza Age 5 to Adult        Coordination of Care Questionnaire:  :   1) Have you been to an emergency room, urgent care, or hospitalized since your last visit? If yes, where when, and reason for visit? no     2. Have seen or consulted any other health care provider other than [employer] since your last visit? If yes, where when, and reason for visit? NO    3) Do you have an Advanced Directive/ Living Will in place? NO  If yes, do we have a copy on file NO  If no, would you like information NO    Patient is accompanied by self I have received verbal consent from Patricia Robbins to discuss any/all medical information while they are present in the room.     Visit Vitals  /72 (BP 1 Location: Left arm, BP Patient Position: Sitting)   Pulse 79   Temp 97.7 °F (36.5 °C) (Oral)   Resp 16   Ht 5' 7\" (1.702 m)   Wt 166 lb (75.3 kg)   SpO2 99%   BMI 26.00 kg/m²     Cori NATALIEN

## 2019-08-15 NOTE — PROGRESS NOTES
Subjective:      Bianca Harrison is a 33 y.o. female who presents today for UTI. Urinary frequency: symptoms started a few days ago, they include urinary frequency, dysuria. She denies vaginal discharge, back pain, abdominal pain. She has tried AZO, she states she had macrobid left over because she didn't complete her previous rx. She notes symptoms are stable. Patient Active Problem List    Diagnosis Date Noted    Overweight (BMI 25.0-29.9) 06/04/2018    Coccyx pain 11/02/2016    ADD (attention deficit disorder) 08/31/2015    Anxiety 08/31/2015    Allergic rhinitis 08/31/2015    Hearing loss 08/31/2015    Psoriasis 08/31/2015    Frequent UTI 08/31/2015     Current Outpatient Medications   Medication Sig Dispense Refill    atomoxetine (STRATTERA) 18 mg capsule take 1 capsule by mouth once daily  0    TRINIDAD FE 1/20, 28, 1 mg-20 mcg (21)/75 mg (7) tab   1     No Known Allergies  Past Medical History:   Diagnosis Date    ADD (attention deficit disorder)     Allergic rhinitis     Anxiety     Chronic UTI     Hearing loss     dx as a child - never treated. No past surgical history on file. Review of Systems    A comprehensive review of systems was negative except for that written in the HPI. Objective:     Visit Vitals  /72 (BP 1 Location: Left arm, BP Patient Position: Sitting)   Pulse 79   Temp 97.7 °F (36.5 °C) (Oral)   Resp 16   Ht 5' 7\" (1.702 m)   Wt 166 lb (75.3 kg)   LMP 08/02/2019 (Exact Date)   SpO2 99%   BMI 26.00 kg/m²     General appearance: alert, cooperative, no distress, appears stated age  Head: Normocephalic, without obvious abnormality, atraumatic  Eyes: negative  Back: symmetric, no curvature. ROM normal. No CVA tenderness.   Lungs: clear to auscultation bilaterally  Heart: regular rate and rhythm, S1, S2 normal, no murmur, click, rub or gallop  Extremities: extremities normal, atraumatic, no cyanosis or edema  Neurologic: Grossly normal  Nursing note and vitals reviewed  Assessment/Plan:       ICD-10-CM ICD-9-CM    1. Acute cystitis without hematuria N30.00 595.0 nitrofurantoin, macrocrystal-monohydrate, (MACROBID) 100 mg capsule   2. Dysuria R30.0 788.1 AMB POC URINALYSIS DIP STICK AUTO W/O MICRO   UA clear but symptoms line up with UTI, this has been recurrent. Culture pending. Follow-up and Dispositions    · Return if symptoms worsen or fail to improve. Advised her to call back or return to office if symptoms worsen/change/persist.  Discussed expected course/resolution/complications of diagnosis in detail with patient. Medication risks/benefits/costs/interactions/alternatives discussed with patient. She was given an after visit summary which includes diagnoses, current medications, & vitals. She expressed understanding with the diagnosis and plan.

## 2019-08-17 LAB — BACTERIA UR CULT: NO GROWTH

## 2019-08-19 NOTE — PROGRESS NOTES
Urine culture shows no growth, you can stop the antibiotic.  After you've stopped this, let me know if symptoms return

## 2019-08-29 ENCOUNTER — OFFICE VISIT (OUTPATIENT)
Dept: CARDIOLOGY CLINIC | Age: 29
End: 2019-08-29

## 2019-08-29 VITALS
SYSTOLIC BLOOD PRESSURE: 110 MMHG | HEART RATE: 71 BPM | OXYGEN SATURATION: 99 % | DIASTOLIC BLOOD PRESSURE: 70 MMHG | BODY MASS INDEX: 25.9 KG/M2 | HEIGHT: 67 IN | RESPIRATION RATE: 20 BRPM | WEIGHT: 165 LBS

## 2019-08-29 DIAGNOSIS — I47.1 SVT (SUPRAVENTRICULAR TACHYCARDIA) (HCC): Primary | ICD-10-CM

## 2019-08-29 DIAGNOSIS — F41.9 ANXIETY: ICD-10-CM

## 2019-08-29 NOTE — PROGRESS NOTES
HISTORY OF PRESENT ILLNESS  Marcos Haro is a 30 y.o. female     SUMMARY:   Problem List  Date Reviewed: 8/28/2019          Codes Class Noted    Overweight (BMI 25.0-29. 9) ICD-10-CM: E66.3  ICD-9-CM: 278.02  6/4/2018        Coccyx pain ICD-10-CM: M53.3  ICD-9-CM: 724.79  11/2/2016        ADD (attention deficit disorder) ICD-10-CM: F98.8  ICD-9-CM: 314.00  8/31/2015        Anxiety ICD-10-CM: F41.9  ICD-9-CM: 300.00  8/31/2015        Allergic rhinitis ICD-10-CM: J30.9  ICD-9-CM: 477.9  8/31/2015        Hearing loss ICD-10-CM: H91.90  ICD-9-CM: 389.9  8/31/2015        Psoriasis ICD-10-CM: L40.9  ICD-9-CM: 696.1  8/31/2015        Frequent UTI ICD-10-CM: N39.0  ICD-9-CM: 599.0  8/31/2015              Current Outpatient Medications on File Prior to Visit   Medication Sig    TRINIDAD FE 1/20, 28, 1 mg-20 mcg (21)/75 mg (7) tab     nitrofurantoin, macrocrystal-monohydrate, (MACROBID) 100 mg capsule Take 1 Cap by mouth two (2) times a day.  atomoxetine (STRATTERA) 18 mg capsule take 1 capsule by mouth once daily     No current facility-administered medications on file prior to visit. CARDIOLOGY STUDIES TO DATE:  None    Chief Complaint   Patient presents with    Irregular Heart Beat     HPI :  Ms. Casey Beck is a 30year-old female referred from the [place] [facility] for evaluation of tachycardia. Ever since her early 25s, she has had recurrent episodes maybe once or twice a year where she will feel her heart race suddenly for no good reason. This only lasts for a minute or two, so she has never thought much of it. Last Saturday afternoon, she had gotten her hair done and was getting a pedicure when she suddenly had the onset of rapid heart rate associated with dizziness and shortness of breath. It lasted for at least 30 minutes before she finished there and then she drove herself to the [place] ER.   Her initial EKG showed a wide complex tachycardia with a rate of about 150 beats per minute and they started an IV, perhaps gave her Adenosine and she converted to normal sinus rhythm with a right bundle branch block, normal axis and no ST-T wave changes. They also did a CTA of the chest on her, which was negative. She does a lot of walking at work, but does not get any regular exercise. There is no history of hypertension or diabetes. Cholesterol is unknown. She has never smoked. Family history is uncertain because she is adopted. She drinks almost no caffeine and rare amounts of alcohol. She did faint once at about age 16 in [place] and went to the emergency room and was told that she was dehydrated. She has occasional problems with dependent lower extremity edema. She has some back pain and suffers from depression and anxiety. CARDIAC ROS:   negative for chest pain, orthopnea, paroxysmal nocturnal dyspnea, exertional chest pressure/discomfort, claudication, lower extremity edema    Family History   Adopted: Yes   Problem Relation Age of Onset    Drug Abuse Mother     Stroke Father 61       Past Medical History:   Diagnosis Date    ADD (attention deficit disorder)     Allergic rhinitis     Anxiety     Chronic UTI     Hearing loss     dx as a child - never treated. GENERAL ROS:  A comprehensive review of systems was negative except for that written in the HPI.     Visit Vitals  /70 (BP 1 Location: Left arm, BP Patient Position: Sitting)   Pulse 71   Resp 20   Ht 5' 7\" (1.702 m)   Wt 165 lb (74.8 kg)   LMP 08/02/2019 (Exact Date)   SpO2 99%   BMI 25.84 kg/m²       Wt Readings from Last 3 Encounters:   08/29/19 165 lb (74.8 kg)   08/15/19 166 lb (75.3 kg)   06/25/19 154 lb (69.9 kg)            BP Readings from Last 3 Encounters:   08/29/19 110/70   08/15/19 111/72   06/25/19 123/86       PHYSICAL EXAM  General appearance: alert, cooperative, no distress, appears stated age  Neurologic: Alert and oriented X 3  Neck: supple, symmetrical, trachea midline, no adenopathy, no carotid bruit and no JVD  Lungs: clear to auscultation bilaterally  Heart: regular rate and rhythm, S1, S2 normal, no murmur, click, rub or gallop  Abdomen: soft, non-tender. Bowel sounds normal. No masses,  no organomegaly  Extremities: extremities normal, atraumatic, no cyanosis or edema  Pulses: 2+ and symmetric    Lab Results   Component Value Date/Time    Cholesterol, total 188 11/14/2017 08:43 AM    Cholesterol, total 196 11/02/2016 08:25 AM    Cholesterol, total 184 08/31/2015 09:29 AM    HDL Cholesterol 53 11/14/2017 08:43 AM    HDL Cholesterol 52 11/02/2016 08:25 AM    HDL Cholesterol 56 08/31/2015 09:29 AM    LDL, calculated 124 (H) 11/14/2017 08:43 AM    LDL, calculated 133 (H) 11/02/2016 08:25 AM    LDL, calculated 117 08/31/2015 09:29 AM    Triglyceride 56 11/14/2017 08:43 AM    Triglyceride 54 11/02/2016 08:25 AM    Triglyceride 57 08/31/2015 09:29 AM     ASSESSMENT  I suspect Ms. Roni Hernandez probably has SVT and she has probably had it for a long time, though has only had this one bad episode. It could be an RVOT as well, but either way I reassured her that this was not anything life threatening, though it could cause dizziness and low blood pressure if it went on for any length of time like this recent episode. We talked about medical therapy and we talked about the possibility of ablation. She is not interested in either of those right now. I do want to get an echocardiogram on her to make sure there are no structural heart abnormalities. current treatment plan is effective, no change in therapy  lab results and schedule of future lab studies reviewed with patient  reviewed diet, exercise and weight control    Encounter Diagnoses   Name Primary?  SVT (supraventricular tachycardia) (HCC) Yes    Anxiety      No orders of the defined types were placed in this encounter. Follow-up and Dispositions    · Return in about 4 weeks (around 9/26/2019).          Yefri Cavazos III, MD  8/29/2019

## 2019-09-04 ENCOUNTER — TELEPHONE (OUTPATIENT)
Dept: CARDIOLOGY CLINIC | Age: 29
End: 2019-09-04

## 2019-09-04 NOTE — TELEPHONE ENCOUNTER
Called patient. No answer or voicemail to leave message. Will try back. Also sending [employer] message.

## 2019-09-04 NOTE — TELEPHONE ENCOUNTER
----- Message from Jerry Robles MD sent at 9/4/2019  8:11 AM EDT -----  Heart muscle function is normal and valves all ok.  Looks great

## 2019-09-10 ENCOUNTER — OFFICE VISIT (OUTPATIENT)
Dept: INTERNAL MEDICINE CLINIC | Age: 29
End: 2019-09-10

## 2019-09-10 VITALS
WEIGHT: 169.4 LBS | SYSTOLIC BLOOD PRESSURE: 107 MMHG | OXYGEN SATURATION: 99 % | HEART RATE: 70 BPM | BODY MASS INDEX: 26.59 KG/M2 | RESPIRATION RATE: 18 BRPM | TEMPERATURE: 97.7 F | DIASTOLIC BLOOD PRESSURE: 70 MMHG | HEIGHT: 67 IN

## 2019-09-10 DIAGNOSIS — R35.0 URINARY FREQUENCY: ICD-10-CM

## 2019-09-10 DIAGNOSIS — N89.8 VAGINAL DISCHARGE: ICD-10-CM

## 2019-09-10 DIAGNOSIS — Z11.3 SCREEN FOR STD (SEXUALLY TRANSMITTED DISEASE): Primary | ICD-10-CM

## 2019-09-10 LAB
BILIRUB UR QL STRIP: NEGATIVE
GLUCOSE UR-MCNC: NEGATIVE MG/DL
KETONES P FAST UR STRIP-MCNC: NEGATIVE MG/DL
PH UR STRIP: 7.5 [PH] (ref 4.6–8)
PROT UR QL STRIP: NEGATIVE
SP GR UR STRIP: 1.02 (ref 1–1.03)
UA UROBILINOGEN AMB POC: NORMAL (ref 0.2–1)
URINALYSIS CLARITY POC: NORMAL
URINALYSIS COLOR POC: YELLOW
URINE BLOOD POC: NEGATIVE
URINE LEUKOCYTES POC: NORMAL
URINE NITRITES POC: NEGATIVE

## 2019-09-10 NOTE — PROGRESS NOTES
Chief Complaint   Patient presents with    Bladder Infection         1. Have you been to the ER, urgent care clinic since your last visit? Hospitalized since your last visit? Yes Where: [place] Reason for visit: SVT episode    2. Have you seen or consulted any other health care providers outside of the [address] [place] [address] since your last visit? Include any pap smears or colon screening.  No    complains of patient states feels like bladder fullness

## 2019-09-10 NOTE — PROGRESS NOTES
Subjective:      Ana Elliott is a 30 y.o. female who presents today for STD screening. STD screening: she requests screening, she has no sexual contacts with infections that she is aware of but notes white vaginal discharge    UTI concern: symptoms started a week ago, they include bladder fullness and white vaginal discharge. She states she feels like her bladder gets full quickly, she fully voids, and then an hour later she has to go again. She denies dysuria, vaginal pain, vaginal itching, flank pain. She notes she has previously had a yeast infection without vaginal itching that she got after taking macrobid. She notes she is not increasing her fluid intake. Symptoms are stable. Patient Active Problem List    Diagnosis Date Noted    Overweight (BMI 25.0-29.9) 06/04/2018    Coccyx pain 11/02/2016    ADD (attention deficit disorder) 08/31/2015    Anxiety 08/31/2015    Allergic rhinitis 08/31/2015    Hearing loss 08/31/2015    Psoriasis 08/31/2015    Frequent UTI 08/31/2015     Current Outpatient Medications   Medication Sig Dispense Refill    nitrofurantoin, macrocrystal-monohydrate, (MACROBID) 100 mg capsule Take 1 Cap by mouth two (2) times a day. 14 Cap 0    atomoxetine (STRATTERA) 18 mg capsule take 1 capsule by mouth once daily  0    TRINIDAD FE 1/20, 28, 1 mg-20 mcg (21)/75 mg (7) tab   1     No Known Allergies  Past Medical History:   Diagnosis Date    ADD (attention deficit disorder)     Allergic rhinitis     Anxiety     Chronic UTI     Hearing loss     dx as a child - never treated. No past surgical history on file. Review of Systems    A comprehensive review of systems was negative except for that written in the HPI.      Objective:     Visit Vitals  /70 (BP 1 Location: Left arm, BP Patient Position: Sitting)   Pulse 70   Temp 97.7 °F (36.5 °C) (Oral)   Resp 18   Ht 5' 7\" (1.702 m)   Wt 169 lb 6.4 oz (76.8 kg)   LMP 08/31/2019   SpO2 99%   BMI 26.53 kg/m² General appearance: alert, cooperative, no distress, appears stated age  Head: Normocephalic, without obvious abnormality, atraumatic  Eyes: negative  Back: symmetric, no curvature. ROM normal. No CVA tenderness. Lungs: clear to auscultation bilaterally  Heart: regular rate and rhythm, S1, S2 normal, no murmur, click, rub or gallop  Abdomen: soft, non-tender. Bowel sounds normal. No masses,  no organomegaly  Neurologic: Grossly normal  Nursing note and vitals reviewed  Assessment/Plan:       ICD-10-CM ICD-9-CM    1. Screen for STD (sexually transmitted disease) Z11.3 V74.5 HIV 1/2 AG/AB, 4TH GENERATION,W RFLX CONFIRM      RPR W/REFLEX TITER AND TREPONEMA ABS      HEPATITIS PANEL, ACUTE      CT/NG/T.VAGINALIS AMPLIFICATION   2. Urinary frequency R35.0 788.41 AMB POC URINALYSIS DIP STICK AUTO W/ MICRO      URINALYSIS W/ RFLX MICROSCOPIC   3. Vaginal discharge N89.8 623.5 URINALYSIS W/ RFLX MICROSCOPIC   POC UA shows trace leuks, no nitrites. Will send to [facility]. If testing is all negative will reassess symptoms and consider diflucan. Follow-up and Dispositions    · Return if symptoms worsen or fail to improve. Advised her to call back or return to office if symptoms worsen/change/persist.  Discussed expected course/resolution/complications of diagnosis in detail with patient. Medication risks/benefits/costs/interactions/alternatives discussed with patient. She was given an after visit summary which includes diagnoses, current medications, & vitals. She expressed understanding with the diagnosis and plan.

## 2019-09-12 LAB
APPEARANCE UR: CLEAR
BACTERIA #/AREA URNS HPF: ABNORMAL /[HPF]
BILIRUB UR QL STRIP: NEGATIVE
C TRACH RRNA VAG QL NAA+PROBE: NEGATIVE
CASTS URNS QL MICRO: ABNORMAL /LPF
COLOR UR: YELLOW
EPI CELLS #/AREA URNS HPF: >10 /HPF (ref 0–10)
GLUCOSE UR QL: NEGATIVE
HAV IGM SERPL QL IA: NEGATIVE
HBV CORE IGM SERPL QL IA: NEGATIVE
HBV SURFACE AG SERPL QL IA: NEGATIVE
HCV AB S/CO SERPL IA: <0.1 S/CO RATIO (ref 0–0.9)
HGB UR QL STRIP: NEGATIVE
HIV 1+2 AB+HIV1 P24 AG SERPL QL IA: NON REACTIVE
KETONES UR QL STRIP: NEGATIVE
LEUKOCYTE ESTERASE UR QL STRIP: ABNORMAL
MICRO URNS: ABNORMAL
MUCOUS THREADS URNS QL MICRO: PRESENT
N GONORRHOEA RRNA VAG QL NAA+PROBE: NEGATIVE
NITRITE UR QL STRIP: NEGATIVE
PH UR STRIP: 7 [PH] (ref 5–7.5)
PROT UR QL STRIP: NEGATIVE
RBC #/AREA URNS HPF: ABNORMAL /HPF (ref 0–2)
RPR SER QL: NON REACTIVE
SP GR UR: 1.02 (ref 1–1.03)
T VAGINALIS RRNA VAG QL NAA+PROBE: NEGATIVE
UROBILINOGEN UR STRIP-MCNC: 0.2 MG/DL (ref 0.2–1)
WBC #/AREA URNS HPF: ABNORMAL /HPF (ref 0–5)

## 2019-09-13 DIAGNOSIS — B37.31 VAGINAL YEAST INFECTION: Primary | ICD-10-CM

## 2019-09-13 RX ORDER — FLUCONAZOLE 150 MG/1
TABLET ORAL
Qty: 2 TAB | Refills: 0 | Status: SHIPPED | OUTPATIENT
Start: 2019-09-13 | End: 2019-12-02

## 2019-09-13 NOTE — PROGRESS NOTES
STD screen shows no infection. Urine shows no infection either. Please message me to let me know how you are feeling.  I am going to send the diflucan like we discussed as this is likely a yeast infection

## 2019-12-02 ENCOUNTER — OFFICE VISIT (OUTPATIENT)
Dept: INTERNAL MEDICINE CLINIC | Age: 29
End: 2019-12-02

## 2019-12-02 VITALS
BODY MASS INDEX: 25.43 KG/M2 | DIASTOLIC BLOOD PRESSURE: 40 MMHG | RESPIRATION RATE: 16 BRPM | TEMPERATURE: 98.5 F | HEIGHT: 67 IN | OXYGEN SATURATION: 99 % | SYSTOLIC BLOOD PRESSURE: 94 MMHG | WEIGHT: 162 LBS | HEART RATE: 82 BPM

## 2019-12-02 DIAGNOSIS — F41.9 ANXIETY: ICD-10-CM

## 2019-12-02 DIAGNOSIS — F98.8 ATTENTION DEFICIT DISORDER, UNSPECIFIED HYPERACTIVITY PRESENCE: ICD-10-CM

## 2019-12-02 DIAGNOSIS — R35.0 URINARY FREQUENCY: ICD-10-CM

## 2019-12-02 DIAGNOSIS — L40.9 PSORIASIS: ICD-10-CM

## 2019-12-02 DIAGNOSIS — E66.3 OVERWEIGHT (BMI 25.0-29.9): ICD-10-CM

## 2019-12-02 DIAGNOSIS — I47.1 PAROXYSMAL SVT (SUPRAVENTRICULAR TACHYCARDIA) (HCC): ICD-10-CM

## 2019-12-02 DIAGNOSIS — Z00.00 ANNUAL PHYSICAL EXAM: Primary | ICD-10-CM

## 2019-12-02 RX ORDER — BUPROPION HYDROCHLORIDE 150 MG/1
TABLET, EXTENDED RELEASE ORAL
Refills: 0 | COMMUNITY
Start: 2019-09-18 | End: 2020-12-02 | Stop reason: ALTCHOICE

## 2019-12-02 RX ORDER — PREDNISONE 10 MG/1
TABLET ORAL
Qty: 42 TAB | Refills: 0 | Status: SHIPPED | OUTPATIENT
Start: 2019-12-02 | End: 2020-12-02 | Stop reason: ALTCHOICE

## 2019-12-02 RX ORDER — ZOLPIDEM TARTRATE 5 MG/1
TABLET ORAL
Refills: 0 | COMMUNITY
Start: 2019-09-18 | End: 2020-12-02 | Stop reason: ALTCHOICE

## 2019-12-02 NOTE — PROGRESS NOTES
Eliseo Rudolph is a 30 y.o. female  Chief Complaint   Patient presents with    Complete Physical     Visit Vitals  /74 (BP 1 Location: Left arm, BP Patient Position: At rest)   Pulse 82   Temp 98.5 °F (36.9 °C) (Oral)   Resp 16   Ht 5' 7\" (1.702 m)   Wt 162 lb (73.5 kg)   SpO2 99%   BMI 25.37 kg/m²      Health Maintenance Due   Topic Date Due    PAP AKA CERVICAL CYTOLOGY  08/26/2012    Influenza Age 5 to Adult  08/01/2019   Flu shot done at work. HPI    SVT dx with palpitations evaluated in ER in August  - saw cardiologist afterwards - instructed that only needed to return if sx returned. Saw cardiology. Reviewed notes from cardiology - no need for follow up at this time. ?[facility] lung [facility]. Requested record and reviewed with pt. Nodule is less than 3 mm and per Fleischner guidelines does not require follow up. Dry patches over entire body - started 1 month ago. Tried adding more oil/lotion - itching regularly. Has hx psoriasis B hands \"entire life\". Left eye has been watering regularly - has been told in the past that this is related to psoriasis. UTD with eye doctor. Hx Anx/Dep/ADD - Single mother, student, working full time. Seeing therapist & psychiatrist regularly. Seeing pelvic doctor for urinary frequency without UTI per gyn. Review of Systems   Constitutional: Negative for fever and malaise/fatigue. Eyes: Positive for discharge and redness. Negative for blurred vision and pain. Respiratory: Negative for shortness of breath. Cardiovascular: Negative for chest pain and palpitations. Gastrointestinal: Negative for abdominal pain, nausea and vomiting. Genitourinary: Positive for frequency and urgency. Negative for dysuria. Skin: Positive for itching and rash. Neurological: Negative for dizziness, loss of consciousness and weakness. Endo/Heme/Allergies: Negative for environmental allergies.    Psychiatric/Behavioral: Positive for depression. Negative for substance abuse and suicidal ideas. The patient is nervous/anxious and has insomnia. Physical Exam  Vitals signs and nursing note reviewed. Constitutional:       General: She is not in acute distress. Appearance: She is well-developed. HENT:      Head: Normocephalic and atraumatic. Right Ear: External ear normal.      Left Ear: External ear normal.      Nose: Nose normal.   Eyes:      General:         Right eye: No discharge. Extraocular Movements: Extraocular movements intact. Neck:      Musculoskeletal: Neck supple. Vascular: No JVD. Comments: No bruit bilateral carotid arteries. Cardiovascular:      Rate and Rhythm: Normal rate and regular rhythm. Heart sounds: Normal heart sounds. Pulmonary:      Effort: Pulmonary effort is normal. No respiratory distress. Breath sounds: Normal breath sounds. Abdominal:      General: Abdomen is flat. There is no distension. Palpations: Abdomen is soft. Tenderness: There is no guarding. Musculoskeletal: Normal range of motion. General: No swelling. Right lower leg: No edema. Left lower leg: No edema. Skin:     General: Skin is warm and dry. Comments: Patches of dark, dry skin with irregular borders throughout arms & legs. Hands very dry with more frequent patches of rash. Ventral R wrist with large psoriasis plaque. L lower eyelid red, inflamed, with dry skin. Neurological:      Mental Status: She is alert and oriented to person, place, and time. Psychiatric:         Mood and Affect: Mood normal.         Behavior: Behavior normal.         Thought Content: Thought content normal.         Judgment: Judgment normal.         Diagnoses and all orders for this visit:    1. Annual physical exam  -     METABOLIC PANEL, COMPREHENSIVE; Future  -     CBC WITH AUTOMATED DIFF; Future  -     TSH 3RD GENERATION; Future  -     LIPID PANEL; Future    2.  Overweight (BMI 25.0-29.9)    3. Urinary frequency    4. Anxiety    5. Attention deficit disorder, unspecified hyperactivity presence    6. Psoriasis  -     REFERRAL TO DERMATOLOGY  -     predniSONE (DELTASONE) 10 mg tablet; Patient has been given instructions in office. 7. Paroxysmal SVT (supraventricular tachycardia) (HCC)    Continue routine follow ups with Psychiatry. Encouraged compliance. Encouraged pt to consider pelvic PT if urogyn agrees. Follow up with cardiology if palpitations develop. Pt agrees. Discussed diet/exercise and options for/importance of losing weight.

## 2019-12-02 NOTE — Clinical Note
Please request CT scan from [facility] August 2019 and flag it for me so I can send pt a message about.

## 2019-12-02 NOTE — PROGRESS NOTES
Patient has been informed of any other discussion outside the parameters of the physical could result in other charges including a co pay, patient verbalized understanding. Chief Complaint   Patient presents with    Complete Physical     1. Have you been to the ER, urgent care clinic since your last visit? Hospitalized since your last visit? No    2. Have you seen or consulted any other health care providers outside of the [address] [place] [address] since your last visit? Include any pap smears or colon screening.  No

## 2019-12-03 DIAGNOSIS — Z00.00 ANNUAL PHYSICAL EXAM: ICD-10-CM

## 2019-12-04 LAB
ALBUMIN SERPL-MCNC: 4.4 G/DL (ref 3.5–5.5)
ALBUMIN/GLOB SERPL: 1.8 {RATIO} (ref 1.2–2.2)
ALP SERPL-CCNC: 33 IU/L (ref 39–117)
ALT SERPL-CCNC: 9 IU/L (ref 0–32)
AST SERPL-CCNC: 14 IU/L (ref 0–40)
BASOPHILS # BLD AUTO: 0 X10E3/UL (ref 0–0.2)
BASOPHILS NFR BLD AUTO: 0 %
BILIRUB SERPL-MCNC: 0.7 MG/DL (ref 0–1.2)
BUN SERPL-MCNC: 13 MG/DL (ref 6–20)
BUN/CREAT SERPL: 19 (ref 9–23)
CALCIUM SERPL-MCNC: 9.2 MG/DL (ref 8.7–10.2)
CHLORIDE SERPL-SCNC: 100 MMOL/L (ref 96–106)
CHOLEST SERPL-MCNC: 182 MG/DL (ref 100–199)
CO2 SERPL-SCNC: 23 MMOL/L (ref 20–29)
CREAT SERPL-MCNC: 0.69 MG/DL (ref 0.57–1)
EOSINOPHIL # BLD AUTO: 0.1 X10E3/UL (ref 0–0.4)
EOSINOPHIL NFR BLD AUTO: 2 %
ERYTHROCYTE [DISTWIDTH] IN BLOOD BY AUTOMATED COUNT: 12.6 % (ref 12.3–15.4)
GLOBULIN SER CALC-MCNC: 2.5 G/DL (ref 1.5–4.5)
GLUCOSE SERPL-MCNC: 88 MG/DL (ref 65–99)
HCT VFR BLD AUTO: 36.5 % (ref 34–46.6)
HDLC SERPL-MCNC: 57 MG/DL
HGB BLD-MCNC: 12.5 G/DL (ref 11.1–15.9)
IMM GRANULOCYTES # BLD AUTO: 0 X10E3/UL (ref 0–0.1)
IMM GRANULOCYTES NFR BLD AUTO: 0 %
INTERPRETATION, 910389: NORMAL
LDLC SERPL CALC-MCNC: 113 MG/DL (ref 0–99)
LYMPHOCYTES # BLD AUTO: 2 X10E3/UL (ref 0.7–3.1)
LYMPHOCYTES NFR BLD AUTO: 33 %
MCH RBC QN AUTO: 30.4 PG (ref 26.6–33)
MCHC RBC AUTO-ENTMCNC: 34.2 G/DL (ref 31.5–35.7)
MCV RBC AUTO: 89 FL (ref 79–97)
MONOCYTES # BLD AUTO: 0.3 X10E3/UL (ref 0.1–0.9)
MONOCYTES NFR BLD AUTO: 5 %
NEUTROPHILS # BLD AUTO: 3.6 X10E3/UL (ref 1.4–7)
NEUTROPHILS NFR BLD AUTO: 60 %
PLATELET # BLD AUTO: 284 X10E3/UL (ref 150–450)
POTASSIUM SERPL-SCNC: 4.2 MMOL/L (ref 3.5–5.2)
PROT SERPL-MCNC: 6.9 G/DL (ref 6–8.5)
RBC # BLD AUTO: 4.11 X10E6/UL (ref 3.77–5.28)
SODIUM SERPL-SCNC: 135 MMOL/L (ref 134–144)
TRIGL SERPL-MCNC: 62 MG/DL (ref 0–149)
TSH SERPL DL<=0.005 MIU/L-ACNC: 1.56 UIU/ML (ref 0.45–4.5)
VLDLC SERPL CALC-MCNC: 12 MG/DL (ref 5–40)
WBC # BLD AUTO: 6.1 X10E3/UL (ref 3.4–10.8)

## 2019-12-04 NOTE — PROGRESS NOTES
Blood Count, thyroid, liver enzymes, kidney function, and electrolytes are all normal/acceptable. Good! Cholesterol is improving, but remains slightly high. Decrease fried/fatty foods, red meat, butter, oils, and increase cardio exercise.

## 2019-12-10 ENCOUNTER — OFFICE VISIT (OUTPATIENT)
Dept: INTERNAL MEDICINE CLINIC | Age: 29
End: 2019-12-10

## 2019-12-10 VITALS
SYSTOLIC BLOOD PRESSURE: 120 MMHG | RESPIRATION RATE: 16 BRPM | BODY MASS INDEX: 26.03 KG/M2 | HEART RATE: 111 BPM | HEIGHT: 66 IN | WEIGHT: 162 LBS | TEMPERATURE: 98.5 F | DIASTOLIC BLOOD PRESSURE: 70 MMHG | OXYGEN SATURATION: 98 %

## 2019-12-10 DIAGNOSIS — R35.0 URINARY FREQUENCY: Primary | ICD-10-CM

## 2019-12-10 DIAGNOSIS — Z87.440 HX: UTI (URINARY TRACT INFECTION): ICD-10-CM

## 2019-12-10 DIAGNOSIS — N93.0 PCB (POST COITAL BLEEDING): ICD-10-CM

## 2019-12-10 NOTE — PATIENT INSTRUCTIONS
Be sure to let your other specialists know Clayton Vidales NP, is your PCP so they'll send info about your visits and testing to her

## 2019-12-10 NOTE — PROGRESS NOTES
29 yo female here for STI testing. She is monogamous with her partner. She continues to have bleeding after intercourse for the past few weeks; there is no pain with intercourse. She is having a feeling of relief after urination, but is having urinary frequency. No back pain or fever. She tries to keep hydrated. She has had recent nocturia. She had STI blood test as well as urine for CT/NG/TR in September, and she just had a complete physical with JAZMINE Schneider, at [facility] on 12/2/19 (she was accustomed to Ebb Phenes being with Raymond Longoria when they were at [facility], and was told she couldn't see Edmundo Prieto for a physical until March 2020). She reports she is having her routine PAP next week with her GYN at [facility].  She also sees Dr. Maribel Ackerman, Uro/GYN (first visit last week) for eval of urinary freq per referral of her GYN. She had a menstrual cycle a few weeks ago. She also had a menstrual cycle earlier in Nov after having a miscarriage. Earlier this afternoon she took one of her finals, and she was rushing to get here for this appointment. PE: WNWD BF is alert   T - 98.5   Repeat BP = 120/70   Back - no CVA tenderness    Imp: Urinary freq and nocturia   Hx UTIs   Post-coital bleeding and fear of STD    Plan: Urinalysis w/ reflex culture   Urine for CT/NG/TR   I asked her to have her specialists send reports of visits to her PCP, Raymond Longoria  ______________________________  Expected course of current diagnosed problem(s) as well as expected progression and possible complications, and desired follow up with provider are discussed with patient. Patient is encouraged to be back in touch with any questions or concerns. Patient expresses understanding of plan of care. Patient is given AVS which includes diagnoses, current medications, vitals.

## 2019-12-10 NOTE — PROGRESS NOTES
Verified name and birth date for privacy precautions. Chart reviewed in preparation for today's visit. Chief Complaint   Patient presents with    Other     STD check. last pap 12/18. upcoming 12/19/19          Health Maintenance Due   Topic    PAP AKA CERVICAL CYTOLOGY          Wt Readings from Last 3 Encounters:   12/10/19 162 lb (73.5 kg)   12/02/19 162 lb (73.5 kg)   09/10/19 169 lb 6.4 oz (76.8 kg)     Temp Readings from Last 3 Encounters:   12/10/19 98.5 °F (36.9 °C) (Oral)   12/02/19 98.5 °F (36.9 °C) (Oral)   09/10/19 97.7 °F (36.5 °C) (Oral)     BP Readings from Last 3 Encounters:   12/10/19 142/90   12/02/19 94/40   09/10/19 107/70     Pulse Readings from Last 3 Encounters:   12/10/19 (!) 111   12/02/19 82   09/10/19 70               Coordination of Care Questionnaire:  :     1) Have you been to an emergency room, urgent care clinic since your last visit? yes   Hospitalized since your last visit? yes             2) Have you seen or consulted any other health care providers outside of [address] [place] [address] since your last visit?  yes  (Include any pap smears or colon screenings in this section.)    3) Do you have an Advance Directive on file? no          ------------------------------------------------

## 2019-12-13 ENCOUNTER — TELEPHONE (OUTPATIENT)
Dept: INTERNAL MEDICINE CLINIC | Age: 29
End: 2019-12-13

## 2019-12-13 NOTE — TELEPHONE ENCOUNTER
----- Message from [employer] sent at 12/13/2019  7:31 AM EST -----  Regarding: ANP. EAST  General Message/Vendor Calls    Caller's first and last name: ((patient))    Reason for call: MY chart message    Callback required yes/no and why: Yes explanation about message and appt     Best contact number(s): [phone]      Details to clarify the request: Pt stated that she received a message on [employer] with ((if you have any questions on your appt )) and nothing else ws stated. Pt is confused about the message and would like a explanation. Pt had labs done and would like to know the results.       [employer]

## 2019-12-14 LAB
APPEARANCE UR: CLEAR
BACTERIA #/AREA URNS HPF: ABNORMAL /[HPF]
BACTERIA UR CULT: ABNORMAL
BACTERIA UR CULT: ABNORMAL
BILIRUB UR QL STRIP: NEGATIVE
C TRACH RRNA SPEC QL NAA+PROBE: NEGATIVE
CASTS URNS MICRO: ABNORMAL
CASTS URNS QL MICRO: PRESENT /LPF
COLOR UR: YELLOW
EPI CELLS #/AREA URNS HPF: >10 /HPF (ref 0–10)
GLUCOSE UR QL: NEGATIVE
HGB UR QL STRIP: ABNORMAL
KETONES UR QL STRIP: ABNORMAL
LEUKOCYTE ESTERASE UR QL STRIP: ABNORMAL
MICRO URNS: ABNORMAL
MUCOUS THREADS URNS QL MICRO: PRESENT
N GONORRHOEA RRNA SPEC QL NAA+PROBE: NEGATIVE
NITRITE UR QL STRIP: NEGATIVE
PH UR STRIP: 8.5 [PH] (ref 5–7.5)
PROT UR QL STRIP: ABNORMAL
RBC #/AREA URNS HPF: >30 /HPF (ref 0–2)
SP GR UR: 1.01 (ref 1–1.03)
T VAGINALIS DNA SPEC QL NAA+PROBE: NEGATIVE
URINALYSIS REFLEX , 377201: ABNORMAL
UROBILINOGEN UR STRIP-MCNC: 1 MG/DL (ref 0.2–1)
WBC #/AREA URNS HPF: ABNORMAL /HPF (ref 0–5)

## 2019-12-17 ENCOUNTER — PATIENT MESSAGE (OUTPATIENT)
Dept: INTERNAL MEDICINE CLINIC | Age: 29
End: 2019-12-17

## 2019-12-17 RX ORDER — AMOXICILLIN 500 MG/1
500 CAPSULE ORAL 2 TIMES DAILY
Qty: 10 CAP | Refills: 0 | Status: SHIPPED | OUTPATIENT
Start: 2019-12-17 | End: 2019-12-22

## 2019-12-17 NOTE — TELEPHONE ENCOUNTER
From: Mere Saleh MD  To: Cherry Mateusz  Sent: 12/17/2019 4:36 PM EST  Subject: results    Dear Ms. Luis Alberto Burnham labs done recently showed that you do not have a STI, but you have a slight bladder infection. I recommend taking amoxicillin 500mg twice daily for 5 days to treat this. Where would like for us to send this script? Please let me know if you have any questions.      Mere Saleh MD

## 2019-12-17 NOTE — PROGRESS NOTES
ua shows beta hemolytic strep. Treat with amoxicillin 500mg bid for 5 days.  Pt notified via [employer] 12/17/2019

## 2020-01-20 LAB — CREATININE, EXTERNAL: 0.67

## 2020-05-08 ENCOUNTER — E-VISIT (OUTPATIENT)
Dept: INTERNAL MEDICINE CLINIC | Age: 30
End: 2020-05-08

## 2020-05-08 DIAGNOSIS — Z11.3 SCREEN FOR STD (SEXUALLY TRANSMITTED DISEASE): Primary | ICD-10-CM

## 2020-05-11 NOTE — TELEPHONE ENCOUNTER
Mayda Gallagher (1990) initiated an asynchronous digital communication through [facility]. HPI: per patient questionnaire     Exam: not applicable    Diagnoses and all orders for this visit:  Diagnoses and all orders for this visit:    1. Screen for STD (sexually transmitted disease)  -     HIV 1/2 AG/AB, 4TH GENERATION,W RFLX CONFIRM  -     RPR W/REFLEX TITER AND TREPONEMA ABS  -     CT/NG/T.VAGINALIS AMPLIFICATION  -     HEPATITIS PANEL, ACUTE          Time: EV2 - 11-20 minutes were spent on the digital evaluation and management of this patient.        Meg Nath NP

## 2020-05-14 LAB
C TRACH RRNA SPEC QL NAA+PROBE: NEGATIVE
HAV IGM SERPL QL IA: NEGATIVE
HBV CORE IGM SERPL QL IA: NEGATIVE
HBV SURFACE AG SERPL QL IA: NEGATIVE
HCV AB S/CO SERPL IA: <0.1 S/CO RATIO (ref 0–0.9)
HIV 1+2 AB+HIV1 P24 AG SERPL QL IA: NON REACTIVE
N GONORRHOEA RRNA SPEC QL NAA+PROBE: NEGATIVE
RPR SER QL: NON REACTIVE
T VAGINALIS DNA SPEC QL NAA+PROBE: NEGATIVE

## 2020-06-17 ENCOUNTER — E-VISIT (OUTPATIENT)
Dept: PRIMARY CARE CLINIC | Age: 30
End: 2020-06-17

## 2020-06-17 DIAGNOSIS — N30.00 ACUTE CYSTITIS WITHOUT HEMATURIA: Primary | ICD-10-CM

## 2020-06-17 RX ORDER — NITROFURANTOIN 25; 75 MG/1; MG/1
100 CAPSULE ORAL 2 TIMES DAILY
Qty: 14 CAP | Refills: 0 | Status: SHIPPED | OUTPATIENT
Start: 2020-06-17 | End: 2020-12-02 | Stop reason: ALTCHOICE

## 2020-06-17 NOTE — TELEPHONE ENCOUNTER
John Ruiz (1990) initiated an asynchronous digital communication through [email or web address]. HPI: per patient questionnaire     Exam: not applicable    Diagnoses and all orders for this visit:  Diagnoses and all orders for this visit:    1. Acute cystitis without hematuria  -     nitrofurantoin, macrocrystal-monohydrate, (MACROBID) 100 mg capsule; Take 1 Cap by mouth two (2) times a day. Time: EV2 - 11-20 minutes were spent on the digital evaluation and management of this patient.        Glenn Senior, NP

## 2020-08-09 ENCOUNTER — E-VISIT (OUTPATIENT)
Dept: PRIMARY CARE CLINIC | Age: 30
End: 2020-08-09

## 2020-08-09 DIAGNOSIS — Z11.3 SCREEN FOR STD (SEXUALLY TRANSMITTED DISEASE): Primary | ICD-10-CM

## 2020-08-10 NOTE — TELEPHONE ENCOUNTER
Nora Franco (1990) initiated an asynchronous digital communication through [employer]. HPI: per patient questionnaire     Exam: not applicable    Diagnoses and all orders for this visit:  Diagnoses and all orders for this visit:    1. Screen for STD (sexually transmitted disease)  -     CT/NG/T.VAGINALIS AMPLIFICATION  -     HIV 1/2 AG/AB, 4TH GENERATION,W RFLX CONFIRM  -     RPR W/REFLEX TITER AND TREPONEMA ABS  -     HEPATITIS PANEL, ACUTE          Time: EV2 - 11-20 minutes were spent on the digital evaluation and management of this patient.        Shonda Rick NP

## 2020-08-20 LAB
HAV IGM SERPL QL IA: NEGATIVE
HBV CORE IGM SERPL QL IA: NEGATIVE
HBV SURFACE AG SERPL QL IA: NEGATIVE
HCV AB S/CO SERPL IA: <0.1 S/CO RATIO (ref 0–0.9)
HIV 1+2 AB+HIV1 P24 AG SERPL QL IA: NON REACTIVE
RPR SER QL: NON REACTIVE

## 2020-08-31 DIAGNOSIS — Z11.3 SCREEN FOR STD (SEXUALLY TRANSMITTED DISEASE): Primary | ICD-10-CM

## 2020-09-05 LAB
C TRACH RRNA SPEC QL NAA+PROBE: NEGATIVE
N GONORRHOEA RRNA SPEC QL NAA+PROBE: NEGATIVE
T VAGINALIS DNA SPEC QL NAA+PROBE: NORMAL

## 2020-11-14 ENCOUNTER — HOSPITAL ENCOUNTER (EMERGENCY)
Age: 30
Discharge: HOME | End: 2020-11-14
Attending: EMERGENCY MEDICINE
Payer: COMMERCIAL

## 2020-11-14 VITALS
WEIGHT: 180 LBS | DIASTOLIC BLOOD PRESSURE: 77 MMHG | RESPIRATION RATE: 21 BRPM | HEIGHT: 66 IN | OXYGEN SATURATION: 100 % | SYSTOLIC BLOOD PRESSURE: 125 MMHG | BODY MASS INDEX: 28.93 KG/M2 | TEMPERATURE: 97.8 F | HEART RATE: 79 BPM

## 2020-11-14 DIAGNOSIS — I47.1 SVT (SUPRAVENTRICULAR TACHYCARDIA) (HCC): Primary | ICD-10-CM

## 2020-11-14 LAB
ALBUMIN SERPL-MCNC: 3.5 G/DL (ref 3.5–5)
ALBUMIN/GLOB SERPL: 0.9 {RATIO} (ref 1.1–2.2)
ALP SERPL-CCNC: 46 U/L (ref 45–117)
ALT SERPL-CCNC: 20 U/L (ref 12–78)
ANION GAP SERPL CALC-SCNC: 8 MMOL/L (ref 5–15)
AST SERPL-CCNC: 18 U/L (ref 15–37)
BASOPHILS # BLD: 0 K/UL (ref 0–0.1)
BASOPHILS NFR BLD: 0 % (ref 0–1)
BILIRUB SERPL-MCNC: 0.3 MG/DL (ref 0.2–1)
BUN SERPL-MCNC: 11 MG/DL (ref 6–20)
BUN/CREAT SERPL: 14 (ref 12–20)
CALCIUM SERPL-MCNC: 8.5 MG/DL (ref 8.5–10.1)
CHLORIDE SERPL-SCNC: 107 MMOL/L (ref 97–108)
CO2 SERPL-SCNC: 24 MMOL/L (ref 21–32)
CREAT SERPL-MCNC: 0.81 MG/DL (ref 0.55–1.02)
DIFFERENTIAL METHOD BLD: NORMAL
EOSINOPHIL # BLD: 0.1 K/UL (ref 0–0.4)
EOSINOPHIL NFR BLD: 1 % (ref 0–7)
ERYTHROCYTE [DISTWIDTH] IN BLOOD BY AUTOMATED COUNT: 11.8 % (ref 11.5–14.5)
GLOBULIN SER CALC-MCNC: 4.1 G/DL (ref 2–4)
GLUCOSE SERPL-MCNC: 81 MG/DL (ref 65–100)
HCT VFR BLD AUTO: 38.4 % (ref 35–47)
HGB BLD-MCNC: 13.4 G/DL (ref 11.5–16)
IMM GRANULOCYTES # BLD AUTO: 0 K/UL (ref 0–0.04)
IMM GRANULOCYTES NFR BLD AUTO: 0 % (ref 0–0.5)
LYMPHOCYTES # BLD: 2.2 K/UL (ref 0.8–3.5)
LYMPHOCYTES NFR BLD: 25 % (ref 12–49)
MAGNESIUM SERPL-MCNC: 2.1 MG/DL (ref 1.6–2.4)
MCH RBC QN AUTO: 30.7 PG (ref 26–34)
MCHC RBC AUTO-ENTMCNC: 34.9 G/DL (ref 30–36.5)
MCV RBC AUTO: 88.1 FL (ref 80–99)
MONOCYTES # BLD: 0.4 K/UL (ref 0–1)
MONOCYTES NFR BLD: 5 % (ref 5–13)
NEUTS SEG # BLD: 5.9 K/UL (ref 1.8–8)
NEUTS SEG NFR BLD: 69 % (ref 32–75)
NRBC # BLD: 0 K/UL (ref 0–0.01)
NRBC BLD-RTO: 0 PER 100 WBC
PLATELET # BLD AUTO: 281 K/UL (ref 150–400)
PMV BLD AUTO: 9.7 FL (ref 8.9–12.9)
POTASSIUM SERPL-SCNC: 4.1 MMOL/L (ref 3.5–5.1)
PROT SERPL-MCNC: 7.6 G/DL (ref 6.4–8.2)
RBC # BLD AUTO: 4.36 M/UL (ref 3.8–5.2)
SODIUM SERPL-SCNC: 139 MMOL/L (ref 136–145)
TROPONIN I SERPL-MCNC: <0.05 NG/ML
TSH SERPL DL<=0.05 MIU/L-ACNC: 1.06 UIU/ML (ref 0.36–3.74)
WBC # BLD AUTO: 8.7 K/UL (ref 3.6–11)

## 2020-11-14 PROCEDURE — 99285 EMERGENCY DEPT VISIT HI MDM: CPT

## 2020-11-14 PROCEDURE — 84443 ASSAY THYROID STIM HORMONE: CPT

## 2020-11-14 PROCEDURE — 99284 EMERGENCY DEPT VISIT MOD MDM: CPT

## 2020-11-14 PROCEDURE — 84484 ASSAY OF TROPONIN QUANT: CPT

## 2020-11-14 PROCEDURE — 93005 ELECTROCARDIOGRAM TRACING: CPT

## 2020-11-14 PROCEDURE — 83735 ASSAY OF MAGNESIUM: CPT

## 2020-11-14 PROCEDURE — 80053 COMPREHEN METABOLIC PANEL: CPT

## 2020-11-14 PROCEDURE — 85025 COMPLETE CBC W/AUTO DIFF WBC: CPT

## 2020-11-14 PROCEDURE — 36415 COLL VENOUS BLD VENIPUNCTURE: CPT

## 2020-11-14 RX ORDER — SODIUM CHLORIDE 0.9 % (FLUSH) 0.9 %
5-40 SYRINGE (ML) INJECTION AS NEEDED
Status: DISCONTINUED | OUTPATIENT
Start: 2020-11-14 | End: 2020-11-14 | Stop reason: HOSPADM

## 2020-11-14 RX ORDER — DILTIAZEM HYDROCHLORIDE 120 MG/1
120 CAPSULE, EXTENDED RELEASE ORAL DAILY
Qty: 30 CAP | Refills: 2 | Status: SHIPPED | OUTPATIENT
Start: 2020-11-14 | End: 2021-06-14 | Stop reason: ALTCHOICE

## 2020-11-14 RX ORDER — SODIUM CHLORIDE 0.9 % (FLUSH) 0.9 %
5-40 SYRINGE (ML) INJECTION EVERY 8 HOURS
Status: DISCONTINUED | OUTPATIENT
Start: 2020-11-14 | End: 2020-11-14 | Stop reason: HOSPADM

## 2020-11-14 NOTE — ED PROVIDER NOTES
EMERGENCY DEPARTMENT HISTORY AND PHYSICAL EXAM      Date: 11/14/2020  Patient Name: Umesh Sheridan    History of Presenting Illness     Chief Complaint   Patient presents with    SVT       History Provided By: Patient and EMS    HPI: Umesh Sheridan, 35 y.o. female presents to the ED with cc of SVT. Pt to the ED by EMS. She states prior hx of 1 episode of SVT about a year ago. She had followed up with Cardiology for ECHO which was normal and no further treatment was warranted at that time. She states today she was at work and felt her  Heart racing associated with mild shortness of breath and mild chest tightness. She attempted several different vagal maneuvers which had no effect on her symptoms and 911 was called. Pt was given given a dose of Adenosine in the ambulance with conversion to NSR. She denies any recent illness including fever/chills, cough or cold symptoms. Her chest discomfort has resolved. . She denies any abd pain, n/v/d. There has been no leg pain or swelling. There have been no urinary symptoms. She is currently on her menstrual cycle. There has been no diet changes or life stressors. There are no other complaints, changes, or physical findings at this time. PCP: Pradeep Mcbride NP    No current facility-administered medications on file prior to encounter. Current Outpatient Medications on File Prior to Encounter   Medication Sig Dispense Refill    nitrofurantoin, macrocrystal-monohydrate, (MACROBID) 100 mg capsule Take 1 Cap by mouth two (2) times a day. 14 Cap 0    buPROPion SR (WELLBUTRIN SR) 150 mg SR tablet take 1 tablet by mouth once daily  0    zolpidem (AMBIEN) 5 mg tablet take 1 tablet by mouth at bedtime  0    predniSONE (DELTASONE) 10 mg tablet Patient has been given instructions in office. 42 Tab 0    atomoxetine (STRATTERA) 18 mg capsule take 1 capsule by mouth once daily  0    TRINIDAD FE 1/20, 28, 1 mg-20 mcg (21)/75 mg (7) tab Take 1 Tab by mouth daily.   1 Past History     Past Medical History:  Past Medical History:   Diagnosis Date    ADD (attention deficit disorder)     Allergic rhinitis     Anxiety     Chronic UTI     Hearing loss     dx as a child - never treated.  [facility]       Past Surgical History:  No past surgical history on file. Family History:  Family History   Adopted: Yes   Problem Relation Age of Onset    Drug Abuse Mother     Stroke Father 61       Social History:  Social History     Tobacco Use    Smoking status: Never Smoker    Smokeless tobacco: Never Used   Substance Use Topics    Alcohol use: Yes     Alcohol/week: 0.0 standard drinks     Comment: 2x/month 1-2 drinks     Drug use: No       Allergies:  No Known Allergies      Review of Systems   Review of Systems   Constitutional: Negative. Negative for appetite change, chills, fatigue and fever. HENT: Negative. Negative for congestion, rhinorrhea, sinus pressure and sore throat. Eyes: Negative. Respiratory: Positive for chest tightness. Negative for cough, choking, shortness of breath and wheezing. Cardiovascular: Positive for palpitations. Negative for chest pain and leg swelling. Gastrointestinal: Negative for abdominal pain, constipation, diarrhea, nausea and vomiting. Endocrine: Negative. Genitourinary: Positive for vaginal bleeding (on current menstrual cycle). Negative for difficulty urinating, dysuria, flank pain and urgency. Musculoskeletal: Negative. Skin: Negative. Neurological: Negative. Negative for dizziness, speech difficulty, weakness, light-headedness, numbness and headaches. Psychiatric/Behavioral: Negative. All other systems reviewed and are negative. Physical Exam   Physical Exam  Vitals signs and nursing note reviewed. Constitutional:       General: She is not in acute distress. Appearance: She is well-developed. She is not diaphoretic.    HENT:      Head: Normocephalic and atraumatic. Mouth/Throat:      Mouth: Mucous membranes are moist.      Pharynx: No oropharyngeal exudate. Eyes:      Extraocular Movements: Extraocular movements intact. Conjunctiva/sclera: Conjunctivae normal.      Pupils: Pupils are equal, round, and reactive to light. Neck:      Musculoskeletal: Normal range of motion and neck supple. Vascular: No JVD. Trachea: No tracheal deviation. Cardiovascular:      Rate and Rhythm: Normal rate and regular rhythm. Heart sounds: Normal heart sounds. No murmur. Pulmonary:      Effort: Pulmonary effort is normal. No respiratory distress. Breath sounds: Normal breath sounds. No stridor. No wheezing or rales. Abdominal:      General: There is no distension. Palpations: Abdomen is soft. Tenderness: There is no abdominal tenderness. There is no guarding or rebound. Musculoskeletal: Normal range of motion. General: No tenderness. Right lower leg: No edema. Left lower leg: No edema. Skin:     General: Skin is warm and dry. Capillary Refill: Capillary refill takes less than 2 seconds. Neurological:      Mental Status: She is alert and oriented to person, place, and time. Cranial Nerves: No cranial nerve deficit.       Comments: No gross motor or sensory deficits    Psychiatric:         Behavior: Behavior normal.         Diagnostic Study Results     Labs -     CBC WITH AUTOMATED DIFF (Final result)    Component (Lab Inquiry)   Collection Time  Result Time  WBC  RBC  HGB  HCT  MCV    11/14/20 14:44:00  11/14/20 14:52:50  8.7  4.36  13.4  38.4  88.1         Collection Time  Result Time  MCH  MCHC  RDW  PLT  MPLV    11/14/20 14:44:00  11/14/20 14:52:50  30.7  34.9  11.8  281  9.7         Collection Time  Result Time  NRBC  ANRBC  GRANS  LYMPH  MONOS    11/14/20 14:44:00  11/14/20 14:52:50  0.0  0.00  69  25  5         Collection Time  Result Time  EOS  BASOS  IG  ABG  ABL    11/14/20 14:44:00  11/14/20 14:52:50  1  0  0  5.9  2.2         Collection Time  Result Time  ABM  FIDENCIO  ABB  AIG  DF    11/14/20 14:44:00  11/14/20 14:52:50  0.4  0.1  0.0  0.0  AUTOMATED           Final result                             Contains abnormal data  METABOLIC PANEL, COMPREHENSIVE (Final result)    Component (Lab Inquiry)   Collection Time  Result Time  NA  K  CL  CO2  AGAP    11/14/20 14:44:00  11/14/20 15:21:53  139  4.1  107  24  8         Collection Time  Result Time  GLU  BUN  CREA  BUCR  GFRAA    11/14/20 14:44:00  11/14/20 15:21:53  81  11  0.81  14  >60         Collection Time  Result Time  GFRNA  CA  TBIL  GPT  SGOT    11/14/20 14:44:00  11/14/20 15:21:53  >60   Estimated GFR is calcu. ..  8.5  0.3  20  18         Collection Time  Result Time  AP  TP  ALB  GLOB  AGRAT    11/14/20 14:44:00  11/14/20 15:21:53  46  7.6  3.5  4.1High    0.9Low           Final result                           MAGNESIUM (Final result)    Component (Lab Inquiry)   Collection Time  Result Time  MG    11/14/20 14:44:00  11/14/20 15:21:53  2.1           Final result                             TROPONIN I (Final result)    Component (Lab Inquiry)   Collection Time  Result Time  TROIQ    11/14/20 14:44:00  11/14/20 15:21:42  <0.05   The presence of detect. ..           Final result                             TSH 3RD GENERATION (Final result)    Component (Lab Inquiry)   Collection Time  Result Time  TSH    11/14/20 14:44:00  11/14/20 15:26:43  1.06     Due to TSH hetero. ..           Final result              Radiologic Studies -   No orders to display     CT Results  (Last 48 hours)    None        CXR Results  (Last 48 hours)    None          Medical Decision Making   I am the first provider for this patient. I reviewed the vital signs, available nursing notes, past medical history, past surgical history, family history and social history. Vital Signs-Reviewed the patient's vital signs.   Patient Vitals for the past 12 hrs:   Temp Pulse Resp BP SpO2   11/14/20 1420 97.8 °F (36.6 °C) 88 16 116/75 99 %       EKG interpretation: (Preliminary)  [place], RBBB, rate 83, normal axis/pr/qrs, no acute ST changes, Bobby Anne DO      Records Reviewed: Nursing Notes, Old Medical Records, Previous electrocardiograms, Ambulance Run Sheet, Previous Radiology Studies and Previous Laboratory Studies    Provider Notes (Medical Decision Making):   DDx- SVT, electrolyte abnormalit, thyroiddysfunction    ED Course:   Initial assessment performed. The patients presenting problems have been discussed, and they are in agreement with the care plan formulated and outlined with them. I have encouraged them to ask questions as they arise throughout their visit. Consult-   Case discussed with Cardiology, Dr. Lyndon Fermin, He recommends starting ER Diltiazem and pt can follow-up with her cardiologist.     Disposition:  DC home     DISCHARGE PLAN:  1. Discharge Medication List as of 11/14/2020  3:52 PM      START taking these medications    Details   dilTIAZem ER (TIAZAC) 120 mg capsule Take 1 Cap by mouth daily. , Normal, Disp-30 Cap,R-2         CONTINUE these medications which have NOT CHANGED    Details   nitrofurantoin, macrocrystal-monohydrate, (MACROBID) 100 mg capsule Take 1 Cap by mouth two (2) times a day., Normal, Disp-14 Cap, R-0      buPROPion SR (WELLBUTRIN SR) 150 mg SR tablet take 1 tablet by mouth once daily, Historical Med, R-0      zolpidem (AMBIEN) 5 mg tablet take 1 tablet by mouth at bedtime, Historical Med, R-0      predniSONE (DELTASONE) 10 mg tablet Patient has been given instructions in office. , Print, Disp-42 Tab, R-0      atomoxetine (STRATTERA) 18 mg capsule take 1 capsule by mouth once daily, Historical Med, R-0      TRINIDAD FE 1/20, 28, 1 mg-20 mcg (21)/75 mg (7) tab Take 1 Tab by mouth daily. , Historical Med, R-1, NEIL           2.    Follow-up Information     Follow up With Specialties Details Why Kendell Schroeder MD Cardiology [place]   [address] [place] [address] [facility]          3. Return to ED if worse     Diagnosis     Clinical Impression:   1. SVT (supraventricular tachycardia) ([address]        Attestations:    Jesika Hernandez, DO    Please note that this dictation was completed with [employer], the computer voice recognition software. Quite often unanticipated grammatical, syntax, homophones, and other interpretive errors are inadvertently transcribed by the computer software. Please disregard these errors. Please excuse any errors that have escaped final proofreading. Thank you.

## 2020-11-14 NOTE — DISCHARGE INSTRUCTIONS
Patient Education        Supraventricular Tachycardia: Care Instructions  Your Care Instructions     Having supraventricular tachycardia (SVT) means that from time to time your heart beats abnormally fast. This fast rhythm is caused by changes in the electrical system of your heart. You may feel a fluttering in your chest (palpitations) and have a fast pulse. When your heart is beating fast, you may feel anxious and lightheaded, be short of breath, and feel discomfort in the chest.  Your doctor may prescribe medicines to help slow down your heartbeat. Your doctor may also suggest you try vagal maneuvers when having an episode of SVT. These are things, like bearing down, that might help slow your heart rate. Bearing down means that you try to breathe out with your stomach muscles but you don't let air out of your nose or mouth. Your doctor can show you how to do vagal maneuvers. He or she may suggest you lie down on your back to do them. In some cases, either cardioversion treatment or a procedure called catheter ablation is done to correct SVT. Your doctor may ask you to wear a small electronic device for 1 or 2 days to monitor your heart. It is called a Holter monitor. Follow-up care is a key part of your treatment and safety. Be sure to make and go to all appointments, and call your doctor if you are having problems. It's also a good idea to know your test results and keep a list of the medicines you take. How can you care for yourself at home? · Be safe with medicines. Take your medicines exactly as prescribed. Call your doctor if you think you are having a problem with your medicine. You will get more details on the specific medicines your doctor prescribes. · If your doctor showed you how to do vagal maneuvers, try them when you have an episode. These maneuvers include bearing down or putting an ice-cold, wet towel on your face. · Monitor your condition by keeping a diary of your SVT episodes.  Bring this to your doctor appointments. ? Write down how fast or slow your heart was beating. To count your heart rate:  § Gently place 2 fingers of your hand on the inside of your other wrist, below your thumb. § Count the beats for 30 seconds. § Then, double the result to get the number of beats per minute. ? Write down if your heart rhythm was regular or irregular. ? Write down the symptoms you had.  ? Write down the time of day your symptoms occurred. ? Write down how long your symptoms lasted. ? Write down what you were doing when your symptoms started. ? Write down what may have helped your symptoms go away. · If they trigger episodes, limit or avoid alcohol or drinks with caffeine. · Do not use over-the-counter decongestants, herbal remedies, diet pills, or \"pep\" pills, which often contain stimulants. · Do not use illegal drugs, such as cocaine, ecstasy, or methamphetamine, which can speed up your heart's rhythm. · Do not smoke. Smoking can make this condition worse. If you need help quitting, talk to your doctor about stop-smoking programs and medicines. These can increase your chances of quitting for good. · Be alert for new or worsening symptoms, such as shortness of breath, pounding of your heart, or unusual tiredness. If new symptoms develop or your symptoms become worse, call your doctor. When should you call for help? Call 911 anytime you think you may need emergency care. For example, call if:    · You passed out (lost consciousness).     · You are short of breath. Call your doctor now or seek immediate medical care if:    · You have a fast heartbeat.     · You are dizzy or lightheaded, or feel like you may faint. Watch closely for changes in your health, and be sure to contact your doctor if:    · You do not get better as expected. Where can you learn more?   Go to http://www.gray.com/  Enter G244 in the search box to learn more about \"Supraventricular Tachycardia: Care Instructions. \"  Current as of: December 16, 2019               Content Version: 12.6  © 2951-3223 [employer]. Care instructions adapted under license by [employer] (which disclaims liability or warranty for this information). If you have questions about a medical condition or this instruction, always ask your healthcare professional. [place] [address] any warranty or liability for your use of this information.

## 2020-11-14 NOTE — ED NOTES
Pt comes in via ems with c/o svt, h/o same in August. Received 6 of adenosine per ems at 1336. Pt in nsr.  Denies any chest pain, sob

## 2020-11-15 LAB
ATRIAL RATE: 83 BPM
CALCULATED P AXIS, ECG09: 51 DEGREES
CALCULATED R AXIS, ECG10: 41 DEGREES
CALCULATED T AXIS, ECG11: 23 DEGREES
DIAGNOSIS, 93000: NORMAL
P-R INTERVAL, ECG05: 174 MS
Q-T INTERVAL, ECG07: 404 MS
QRS DURATION, ECG06: 156 MS
QTC CALCULATION (BEZET), ECG08: 474 MS
VENTRICULAR RATE, ECG03: 83 BPM

## 2020-12-02 ENCOUNTER — OFFICE VISIT (OUTPATIENT)
Dept: CARDIOLOGY CLINIC | Age: 30
End: 2020-12-02
Payer: MEDICAID

## 2020-12-02 VITALS
OXYGEN SATURATION: 99 % | DIASTOLIC BLOOD PRESSURE: 80 MMHG | WEIGHT: 194 LBS | HEART RATE: 82 BPM | RESPIRATION RATE: 18 BRPM | HEIGHT: 66 IN | SYSTOLIC BLOOD PRESSURE: 130 MMHG | BODY MASS INDEX: 31.18 KG/M2

## 2020-12-02 DIAGNOSIS — F41.9 ANXIETY: ICD-10-CM

## 2020-12-02 DIAGNOSIS — I47.1 PAROXYSMAL SVT (SUPRAVENTRICULAR TACHYCARDIA) (HCC): Primary | ICD-10-CM

## 2020-12-02 PROCEDURE — 99214 OFFICE O/P EST MOD 30 MIN: CPT | Performed by: SPECIALIST

## 2020-12-02 RX ORDER — ISOTRETINOIN 40 MG/1
40 CAPSULE ORAL
COMMUNITY
Start: 2020-11-13 | End: 2021-06-14 | Stop reason: ALTCHOICE

## 2020-12-02 RX ORDER — CALCIPOTRIENE 50 UG/G
CREAM TOPICAL
COMMUNITY
Start: 2020-10-08

## 2020-12-02 NOTE — PROGRESS NOTES
HISTORY OF PRESENT ILLNESS  Guillermina Chao is a 35 y.o. female     SUMMARY:   Problem List  Date Reviewed: 12/2/2020          Codes Class Noted    Paroxysmal [facility]) [facility] ICD-10-CM: I47.1  ICD-9-CM: 427.0  12/2/2019    Overview Addendum 12/2/2019  4:06 PM by Johann Angulo PA-C     Evaluated by cardiology. Pt to follow up PRN per notes. Overweight (BMI 25.0-29. 9) ICD-10-CM: E66.3  ICD-9-CM: 278.02  6/4/2018        Coccyx pain ICD-10-CM: M53.3  ICD-9-CM: 724.79  11/2/2016        ADD (attention deficit disorder) ICD-10-CM: F98.8  ICD-9-CM: 314.00  8/31/2015        Anxiety ICD-10-CM: F41.9  ICD-9-CM: 300.00  8/31/2015        Allergic rhinitis ICD-10-CM: J30.9  ICD-9-CM: 477.9  8/31/2015        Hearing loss ICD-10-CM: H91.90  ICD-9-CM: 389.9  8/31/2015        Psoriasis ICD-10-CM: L40.9  ICD-9-CM: 696.1  8/31/2015        Frequent UTI ICD-10-CM: N39.0  ICD-9-CM: 599.0  8/31/2015              Current Outpatient Medications on File Prior to Visit   Medication Sig    dilTIAZem ER (TIAZAC) 120 mg capsule Take 1 Cap by mouth daily.  TRINIDAD FE 1/20, 28, 1 mg-20 mcg (21)/75 mg (7) tab Take 1 Tab by mouth daily.  calcipotriene (DOVONEX) 0.005 % topical cream APPLY TO AFFECTED AREA(S) [facility] AND HANDS) ONCE DAILY AS DIRECTED    Amnesteem 40 mg capsule Take 40 mg by mouth every Monday, Wednesday, Friday. No current facility-administered medications on file prior to visit. CARDIOLOGY STUDIES TO DATE:  9/19 tr mr, otherwise normal echo    Chief Complaint   Patient presents with    Follow-up     HPI :  She had another episode of SVT recently. There was no apparent trigger and she could not get rid of it with her usual maneuvers so she called rescue squad and was given adenosine in route. By the time she got the hospital everything had resolved. They put her on some Cardizem she does not like the way it makes her feel. It causes some nausea and so forth. This is really the only serious episode she has had since the one that occurred around the time we last met. She is not been exercising with the pandemic and she is gained a fair amount of weight. CARDIAC ROS:   negative for chest pain, dyspnea, syncope, orthopnea, paroxysmal nocturnal dyspnea, exertional chest pressure/discomfort, claudication, lower extremity edema    Family History   Adopted: Yes   Problem Relation Age of Onset    Drug Abuse Mother     Stroke Father 61       Past Medical History:   Diagnosis Date    ADD (attention deficit disorder)     Allergic rhinitis     Anxiety     Chronic UTI     Hearing loss     dx as a child - never treated.  SV[facility]supraventricular tachycardia) ([facility] 2019       GENERAL ROS:  A comprehensive review of systems was negative except for that written in the HPI.     Visit Vitals  /80 (BP 1 Location: Left arm, BP Patient Position: Sitting)   Pulse 82   Resp 18   Ht 5' 6\" (1.676 m)   Wt 194 lb (88 kg)   SpO2 99%   BMI 31.31 kg/m²       Wt Readings from Last 3 Encounters:   12/02/20 194 lb (88 kg)   11/14/20 180 lb (81.6 kg)   12/10/19 162 lb (73.5 kg)            BP Readings from Last 3 Encounters:   12/02/20 130/80   11/14/20 125/77   12/10/19 120/70       PHYSICAL EXAM  General appearance: alert, cooperative, no distress, appears stated age  Neurologic: Alert and oriented X 3  Neck: supple, symmetrical, trachea midline, no adenopathy, no carotid bruit and no JVD  Lungs: clear to auscultation bilaterally  Heart: regular rate and rhythm, S1, S2 normal, no murmur, click, rub or gallop  Extremities: extremities normal, atraumatic, no cyanosis or edema    Lab Results   Component Value Date/Time    Cholesterol, total 182 12/03/2019 09:08 AM    Cholesterol, total 188 11/14/2017 08:43 AM    Cholesterol, total 196 11/02/2016 08:25 AM    Cholesterol, total 184 08/31/2015 09:29 AM    HDL Cholesterol 57 12/03/2019 09:08 AM    HDL Cholesterol 53 11/14/2017 08:43 AM    HDL Cholesterol 52 11/02/2016 08:25 AM    HDL Cholesterol 56 08/31/2015 09:29 AM    LDL, calculated 113 (H) 12/03/2019 09:08 AM    LDL, calculated 124 (H) 11/14/2017 08:43 AM    LDL, calculated 133 (H) 11/02/2016 08:25 AM    LDL, calculated 117 08/31/2015 09:29 AM    Triglyceride 62 12/03/2019 09:08 AM    Triglyceride 56 11/14/2017 08:43 AM    Triglyceride 54 11/02/2016 08:25 AM    Triglyceride 57 08/31/2015 09:29 AM     ASSESSMENT :      We talked about the options going forward to include medical therapy and/or ablation and she does not want to proceed with either of those right now. That is reasonable given the frequency of her events. She needs no further cardiac testing at this time. current treatment plan is effective, no change in therapy  lab results and schedule of future lab studies reviewed with patient  reviewed diet, exercise and weight control    Encounter Diagnoses   Name Primary?  Paroxysmal SVT (supraventricular tachycardia) (HCC) Yes    Anxiety      Orders Placed This Encounter    calcipotriene (DOVONEX) 0.005 % topical cream    Amnesteem 40 mg capsule       Follow-up and Dispositions    · Return in about 1 year (around 12/2/2021). Ruby Euceda MD  12/2/2020  Please note that this dictation was completed with [employer], the computer voice recognition software. Quite often unanticipated grammatical, syntax, homophones, and other interpretive errors are inadvertently transcribed by the computer software. Please disregard these errors. Please excuse any errors that have escaped final proofreading. Thank you.

## 2021-06-14 ENCOUNTER — OFFICE VISIT (OUTPATIENT)
Dept: INTERNAL MEDICINE CLINIC | Age: 31
End: 2021-06-14
Payer: MEDICAID

## 2021-06-14 VITALS
HEIGHT: 66 IN | HEART RATE: 78 BPM | WEIGHT: 198.9 LBS | TEMPERATURE: 97.8 F | BODY MASS INDEX: 31.97 KG/M2 | SYSTOLIC BLOOD PRESSURE: 100 MMHG | RESPIRATION RATE: 16 BRPM | OXYGEN SATURATION: 99 % | DIASTOLIC BLOOD PRESSURE: 72 MMHG

## 2021-06-14 DIAGNOSIS — N32.81 OVERACTIVE BLADDER: ICD-10-CM

## 2021-06-14 DIAGNOSIS — Z00.00 ENCOUNTER FOR GENERAL ADULT MEDICAL EXAMINATION W/O ABNORMAL FINDINGS: Primary | ICD-10-CM

## 2021-06-14 DIAGNOSIS — G44.85 IDIOPATHIC STABBING HEADACHE: ICD-10-CM

## 2021-06-14 PROBLEM — L30.8 SPONGIOTIC PSORIASIFORM DERMATITIS: Status: ACTIVE | Noted: 2021-06-14

## 2021-06-14 PROBLEM — Z79.899 LONG-TERM CURRENT USE OF ISOTRETINOIN: Status: ACTIVE | Noted: 2020-09-10

## 2021-06-14 PROCEDURE — 99395 PREV VISIT EST AGE 18-39: CPT | Performed by: NURSE PRACTITIONER

## 2021-06-14 RX ORDER — TRETINOIN 1 MG/G
CREAM TOPICAL
COMMUNITY
Start: 2020-09-10

## 2021-06-14 NOTE — PROGRESS NOTES
Subjective:      Rosanne Ospina is a 35 y.o. female who presents today for CPE. Health Maintenance  Immunizations:    Influenza: n/a. Tetanus: up to date. Gardasil: n/a. Cancer screening:   Cervical: reviewed guidelines, UTD, done by OBGYN, records requested. Breast: reviewed guidelines, reviewed SBE with her, UTD, done by OBGYN, records requested    Psoriasis: She is followed by [facility] Dr. Quang Vinson for her psoriasis. She had left eye surgery when this was attacking her eyelid and pulling it down. She will follow up with them in August about the skin graft to left eye. Headache: she notes recurrent sharp pains to right temple. She notes pain a few times a week, it started a few months ago. She notes symptoms will last a few minutes and then self resolve. Pain described as sharp stabbing pain. Overactive bladder: she saw urology and was diagnosed with this and pelvic floor dysfunction. She is interested in getting a second opinion. Patient Care Team:  Skiff, Celinda Dowdy, NP as PCP - General (Nurse Practitioner)  Haylee Shaw NP as PCP - REHABILITATION [facility] Empaneled Provider  Christie Owusu MD (Psychiatry)  Gurdeep Chacon MD (Cardiology)  Kale Hernandez MD (Obstetrics & Gynecology)       The following sections were reviewed & updated as appropriate: PMH, PSH, FH, and SH.         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      calcipotriene (DOVONEX) 0.005 % topical cream APPLY TO AFFECTED AREA(S) (FACE,ARMS AND HANDS) ONCE DAILY AS DIRECTED      TRINIDAD FE 1/20, 28, 1 mg-20 mcg (21)/75 mg (7) tab Take 1 Tab by mouth daily. 1     No Known Allergies  Past Medical History:   Diagnosis Date    ADD (attention deficit disorder)     Allergic rhinitis     Anxiety     Chronic UTI     Hearing loss     dx as a child - never treated.  [facility]        Review of Systems    A comprehensive review of systems was negative except for that written in the HPI. Objective:     Visit Vitals  /72 (BP 1 Location: Left upper arm, BP Patient Position: Sitting, BP Cuff Size: Adult)   Pulse 78   Temp 97.8 °F (36.6 °C) (Temporal)   Resp 16   Ht 5' 6\" (1.676 m)   Wt 198 lb 14.4 oz (90.2 kg)   LMP 05/31/2021 (Exact Date)   SpO2 99%   BMI 32.10 kg/m²       General:  Alert, cooperative, no distress, appears stated age,. Head:  Normocephalic, without obvious abnormality, atraumatic. Eyes:  Conjunctivae/corneas clear. PERRL, EOMs intact. Ears:  Normal TMs and external ear canals both ears. Throat: Deferred   Neck: Supple, symmetrical, trachea midline, no adenopathy, thyroid: no enlargement/tenderness/nodules, no carotid bruit and no JVD. Back:   Symmetric, no curvature. ROM normal. No CVA tenderness. Lungs:   Clear to auscultation bilaterally. Chest wall:  No tenderness or deformity. Heart:  Regular rate and rhythm, S1, S2 normal, no murmur, click, rub or gallop. Abdomen:   Soft, non-tender. Bowel sounds normal. No masses,  No organomegaly. Extremities: Extremities normal, atraumatic, no cyanosis or edema. Pulses: 2+ and symmetric all extremities. Skin: Skin color, texture, turgor normal. Psoriasis noted to bilateral arms and left eyelid   Lymph nodes: Cervical, supraclavicular, and axillary nodes normal.   Neurologic: CNII-XII intact. Normal strength, sensation throughout. Nursing note and vitals reviewed  Assessment/Plan:     1. Encounter for general adult medical examination w/o abnormal findings  - CBC WITH AUTOMATED DIFF; Future  - LIPID PANEL; Future  - METABOLIC PANEL, COMPREHENSIVE; Future  - CBC WITH AUTOMATED DIFF  - LIPID PANEL  - METABOLIC PANEL, COMPREHENSIVE  - URINALYSIS W/ REFLEX CULTURE; Future  - URINALYSIS W/ REFLEX CULTURE    2. Idiopathic stabbing headache  - she will try to get an appointment soon, if not discussed starting antiinflammatories  - REFERRAL TO NEUROLOGY    3. Overactive bladder  - URINALYSIS W/ REFLEX CULTURE; Future  - URINALYSIS W/ REFLEX CULTURE  - REFERRAL TO UROLOGY      Follow-up and Dispositions    · Return for Follow up as needed with Bari Joshi NP. Advised her to call back or return to office if symptoms worsen/change/persist.  Discussed expected course/resolution/complications of diagnosis in detail with patient. Medication risks/benefits/costs/interactions/alternatives discussed with patient. She was given an after visit summary which includes diagnoses, current medications, & vitals. She expressed understanding with the diagnosis and plan.

## 2021-06-17 LAB
ALBUMIN SERPL-MCNC: 4.4 G/DL (ref 3.9–5)
ALBUMIN/GLOB SERPL: 1.5 {RATIO} (ref 1.2–2.2)
ALP SERPL-CCNC: 60 IU/L (ref 48–121)
ALT SERPL-CCNC: 11 IU/L (ref 0–32)
APPEARANCE UR: ABNORMAL
AST SERPL-CCNC: 14 IU/L (ref 0–40)
BACTERIA #/AREA URNS HPF: ABNORMAL /[HPF]
BACTERIA UR CULT: NO GROWTH
BASOPHILS # BLD AUTO: 0 X10E3/UL (ref 0–0.2)
BASOPHILS NFR BLD AUTO: 0 %
BILIRUB SERPL-MCNC: 0.4 MG/DL (ref 0–1.2)
BILIRUB UR QL STRIP: NEGATIVE
BUN SERPL-MCNC: 10 MG/DL (ref 6–20)
BUN/CREAT SERPL: 13 (ref 9–23)
CALCIUM SERPL-MCNC: 9.2 MG/DL (ref 8.7–10.2)
CASTS URNS QL MICRO: ABNORMAL /LPF
CHLORIDE SERPL-SCNC: 102 MMOL/L (ref 96–106)
CHOLEST SERPL-MCNC: 213 MG/DL (ref 100–199)
CO2 SERPL-SCNC: 22 MMOL/L (ref 20–29)
COLOR UR: YELLOW
CREAT SERPL-MCNC: 0.78 MG/DL (ref 0.57–1)
EOSINOPHIL # BLD AUTO: 0.1 X10E3/UL (ref 0–0.4)
EOSINOPHIL NFR BLD AUTO: 3 %
EPI CELLS #/AREA URNS HPF: >10 /HPF (ref 0–10)
ERYTHROCYTE [DISTWIDTH] IN BLOOD BY AUTOMATED COUNT: 11.9 % (ref 11.7–15.4)
GLOBULIN SER CALC-MCNC: 2.9 G/DL (ref 1.5–4.5)
GLUCOSE SERPL-MCNC: 92 MG/DL (ref 65–99)
GLUCOSE UR QL: NEGATIVE
HCT VFR BLD AUTO: 40.7 % (ref 34–46.6)
HDLC SERPL-MCNC: 53 MG/DL
HGB BLD-MCNC: 14 G/DL (ref 11.1–15.9)
HGB UR QL STRIP: NEGATIVE
IMM GRANULOCYTES # BLD AUTO: 0 X10E3/UL (ref 0–0.1)
IMM GRANULOCYTES NFR BLD AUTO: 0 %
KETONES UR QL STRIP: ABNORMAL
LDLC SERPL CALC-MCNC: 139 MG/DL (ref 0–99)
LEUKOCYTE ESTERASE UR QL STRIP: ABNORMAL
LYMPHOCYTES # BLD AUTO: 1.5 X10E3/UL (ref 0.7–3.1)
LYMPHOCYTES NFR BLD AUTO: 29 %
MCH RBC QN AUTO: 30.8 PG (ref 26.6–33)
MCHC RBC AUTO-ENTMCNC: 34.4 G/DL (ref 31.5–35.7)
MCV RBC AUTO: 90 FL (ref 79–97)
MICRO URNS: ABNORMAL
MONOCYTES # BLD AUTO: 0.4 X10E3/UL (ref 0.1–0.9)
MONOCYTES NFR BLD AUTO: 7 %
NEUTROPHILS # BLD AUTO: 3 X10E3/UL (ref 1.4–7)
NEUTROPHILS NFR BLD AUTO: 61 %
NITRITE UR QL STRIP: NEGATIVE
PH UR STRIP: 6 [PH] (ref 5–7.5)
PLATELET # BLD AUTO: 259 X10E3/UL (ref 150–450)
POTASSIUM SERPL-SCNC: 4.5 MMOL/L (ref 3.5–5.2)
PROT SERPL-MCNC: 7.3 G/DL (ref 6–8.5)
PROT UR QL STRIP: ABNORMAL
RBC # BLD AUTO: 4.54 X10E6/UL (ref 3.77–5.28)
RBC #/AREA URNS HPF: ABNORMAL /HPF (ref 0–2)
SODIUM SERPL-SCNC: 138 MMOL/L (ref 134–144)
SP GR UR: 1.02 (ref 1–1.03)
TRIGL SERPL-MCNC: 115 MG/DL (ref 0–149)
URINALYSIS REFLEX, 377202: ABNORMAL
UROBILINOGEN UR STRIP-MCNC: 1 MG/DL (ref 0.2–1)
VLDLC SERPL CALC-MCNC: 21 MG/DL (ref 5–40)
WBC # BLD AUTO: 5 X10E3/UL (ref 3.4–10.8)
WBC #/AREA URNS HPF: ABNORMAL /HPF (ref 0–5)

## 2021-06-17 NOTE — PROGRESS NOTES
Urine culture showed no growth so there is no UTI. Your LDL cholesterol is a little high. Work on eating a low fat diet and limiting saturated fats, red meat, full fat dairy, and any greasy or fried foods. Everything else looked good.

## 2021-06-30 ENCOUNTER — OFFICE VISIT (OUTPATIENT)
Dept: NEUROLOGY | Age: 31
End: 2021-06-30
Payer: MEDICAID

## 2021-06-30 VITALS
SYSTOLIC BLOOD PRESSURE: 128 MMHG | WEIGHT: 196 LBS | OXYGEN SATURATION: 99 % | HEART RATE: 73 BPM | BODY MASS INDEX: 31.5 KG/M2 | DIASTOLIC BLOOD PRESSURE: 76 MMHG | RESPIRATION RATE: 14 BRPM | HEIGHT: 66 IN

## 2021-06-30 DIAGNOSIS — R51.9 NEW ONSET HEADACHE: Primary | ICD-10-CM

## 2021-06-30 PROCEDURE — 99204 OFFICE O/P NEW MOD 45 MIN: CPT | Performed by: PSYCHIATRY & NEUROLOGY

## 2021-06-30 RX ORDER — DIAZEPAM 10 MG/1
TABLET ORAL
Qty: 1 TABLET | Refills: 0 | Status: SHIPPED | OUTPATIENT
Start: 2021-06-30 | End: 2021-07-14 | Stop reason: SDUPTHER

## 2021-06-30 NOTE — PATIENT INSTRUCTIONS
RESULT POLICY      If we have ordered testing for you, know that; \"NO NEWS IS GOOD NEWS! \"     It is our policy that we no longer call patients with results, nor do we  give test results over the phone. We schedule follow up appointments so that your results can be discussed in person. This allows you to address any questions you have regarding the results. If you choose to go to an imaging center outside of [facility], it is your responsibility to bring imaging report and disc to follow up appointment. If something of concern is revealed on your test, we will contact you to discuss the matter and if needed schedule a sooner follow up appointment. Additionally, results may be found by using the My Chart feature and one of our patient service representatives at the  can give you instructions on how to access this feature to utilize our electronic medical record system. Thank you for your understanding. [facility]   Statement to Patients  April 1, 2014      In an effort to ensure the large volume of patient prescription refills is processed in the most efficient and expeditious manner, we are asking our patients to assist us by calling your Pharmacy for all prescription refills, this will include also your  Mail Order Pharmacy. The pharmacy will contact our office electronically to continue the refill process. Please do not wait until the last minute to call your pharmacy. We need at least 48 hours (2days) to fill prescriptions. We also encourage you to call your pharmacy before going to  your prescription to make sure it is ready. With regard to controlled substance prescription refill requests (narcotic refills) that need to be picked up at our office, we ask your cooperation by providing us with at least 72 hours (3days) notice that you will need a refill.     We will not refill narcotic prescription refill requests after 4:00pm on any weekday, Monday through Thursday, or after 2:00pm on Fridays, or on the weekends. We encourage everyone to explore another way of getting your prescription refill request processed using Get-[employer]-Post, our patient web portal through our electronic medical record system. Get-[employer]-Post is an efficient and effective way to communicate your medication request directly to the office and  downloadable as an tony on your smart phone . [employer] also features a review functionality that allows you to view your medication list as well as leave messages for your physician. Are you ready to get connected? If so please review the attatched instructions or speak to any of our staff to get you set up right away! Thank you so much for your cooperation. Should you have any questions please contact our Practice Administrator.

## 2021-06-30 NOTE — PROGRESS NOTES
[facility] and [address] at [facility] Neurology Clinics at [address] [facility], [address] [facility] [place] [address] [address] [facility], [address] [place] [address]  [phone] Office  [phone] Facsimile           Referring: Haylee Shaw NP      Chief Complaint   Patient presents with    New Patient    Headache     sharp HAs ongoing for a couple of mo, started to increase in frequency     50-year-old woman here for initial neurologic consultation regarding headache. She notes that about 2 months ago she started to get these intense sharp stabbing pains in the right temporal region that radiates to the back of the head. Comes on without warning. No inciting factor. She had no changes around that time. She has had over the years a history of headaches where she had to lay down and perhaps got nauseated and sound migrainous but nothing like this. Those headaches are infrequent. She is having these occur several times a week. That last for 1-2 minutes. No loss of consciousness or vision change. No focal weakness. Record review finds emergency department visit in November 2020 complaining of SVT. She tried vagal maneuvers with no effect. EMS gave her adenosine and she converted to normal sinus rhythm. She was put on [facility] ER. Office visit with nurse practitioner [facility] to [facility] June 14, 2021 where she came in for physical exam.  She was noted to have psoriasis followed by dermatology at [facility]. She reported recurrent sharp pain in her right temple a few times a week. She was referred to neurology. Laboratory analysis Kayy 15, 2021:  CBC unremarkable  Lipid panel   Metabolic panel unremarkable  Urinalysis unremarkable except for trace ketonuria and small leukocyte esterase but her culture was no growth.   Past Medical History:   Diagnosis Date    ADD (attention deficit disorder)     Allergic rhinitis     Anxiety  Chronic UTI     Hearing loss     dx as a child - never treated.  SV[facility]ra[facility]ricular tachycardia) ([facility] 2019       Past Surgical History:   Procedure Laterality Date    HX FREE SKIN GRAFT Left     left eye       Current Outpatient Medications   Medication Sig Dispense Refill    tretinoin (RETIN-A) 0.1 % topical cream Apply  to affected area.  calcipotriene (DOVONEX) 0.005 % topical cream APPLY TO AFFECTED AREA(S) (FACE,ARMS AND HANDS) ONCE DAILY AS DIRECTED      TRINIDAD FE 1/20, 28, 1 mg-20 mcg (21)/75 mg (7) tab Take 1 Tab by mouth daily. 1        No Known Allergies    Social History     Tobacco Use    Smoking status: Never Smoker    Smokeless tobacco: Never Used   Vaping Use    Vaping Use: Never used   Substance Use Topics    Alcohol use: Yes     Alcohol/week: 0.0 standard drinks     Comment: 2x/month 1-2 drinks     Drug use: No       Family History   Adopted: Yes   Problem Relation Age of Onset    Drug Abuse Mother     Stroke Father 61     Review of Systems  Pertinent positives and negatives as noted. Examination  Visit Vitals  /76 (BP 1 Location: Left upper arm, BP Patient Position: Sitting, BP Cuff Size: Adult)   Pulse 73   Resp 14   Ht 5' 6\" (1.676 m)   Wt 88.9 kg (196 lb)   LMP 05/31/2021 (Exact Date)   SpO2 99%   BMI 31.64 kg/m²     Neurologically, she is awake, alert, and oriented with normal speech and language. Her cognition is normal.    Intact cranial nerves 2-12. No nystagmus. Disk margins are flat bilaterally. She has normal bulk and tone. She has no abnormal movement. She has no pronation or drift. She generates full strength in the upper and lower extremities to direct confrontational testing. Reflexes are symmetrical in the upper and lower extremities bilaterally. No pathologic reflexes are elicited. Finger nose finger and rapid alternating movements are normal.  Steady gait.       Impression/Plan  New onset intense sharp shooting head pain localized  Likely underlying migraine infrequent  Given this new symptom need to exclude intracranial process such as a vascular lesion, mass lesion etc.  MRI of the brain  EEG  Follow-up after    Elisa Miramontes MD          This note was created using voice recognition software. Despite editing, there may be syntax errors.

## 2021-06-30 NOTE — PROGRESS NOTES
Chief Complaint   Patient presents with    New Patient    Headache     sharp HAs ongoing for a couple of mo, started to increase in frequency     Comes randomly, will last for about 1-2 min at a time  Has at least one per week

## 2021-07-13 ENCOUNTER — HOSPITAL ENCOUNTER (OUTPATIENT)
Dept: MRI IMAGING | Age: 31
Discharge: HOME | End: 2021-07-13
Attending: PSYCHIATRY & NEUROLOGY
Payer: MEDICAID

## 2021-07-13 DIAGNOSIS — R51.9 NEW ONSET HEADACHE: ICD-10-CM

## 2021-07-13 PROCEDURE — A9576 INJ PROHANCE MULTIPACK: HCPCS | Performed by: PSYCHIATRY & NEUROLOGY

## 2021-07-13 PROCEDURE — 70553 MRI BRAIN STEM W/O & W/DYE: CPT

## 2021-07-13 PROCEDURE — 74011636320 HC RX REV CODE- 636/320: Performed by: PSYCHIATRY & NEUROLOGY

## 2021-07-13 RX ADMIN — GADOTERIDOL 18 ML: 279.3 INJECTION, SOLUTION INTRAVENOUS at 16:31

## 2021-07-14 ENCOUNTER — TELEPHONE (OUTPATIENT)
Dept: NEUROLOGY | Age: 31
End: 2021-07-14

## 2021-07-14 DIAGNOSIS — R51.9 NEW ONSET HEADACHE: ICD-10-CM

## 2021-07-14 DIAGNOSIS — G37.9 CNS DEMYELINATION (HCC): Primary | ICD-10-CM

## 2021-07-14 RX ORDER — DIAZEPAM 10 MG/1
TABLET ORAL
Qty: 2 TABLET | Refills: 0 | Status: SHIPPED | OUTPATIENT
Start: 2021-07-14 | End: 2021-08-31 | Stop reason: ALTCHOICE

## 2021-07-14 NOTE — TELEPHONE ENCOUNTER
----- Message from Tasia uRbio MD sent at 7/14/2021  7:06 AM EDT -----  MRI brain findings are not in line with her presenting sxs  Please order MRI cervical/ thoracic with and without contrast  Dx CNS demyelination  Thanks  SE  ----- Message -----  From: Zander Rojas Results In  Sent: 7/13/2021   5:07 PM EDT  To: Tasia Rubio MD

## 2021-07-15 ENCOUNTER — OFFICE VISIT (OUTPATIENT)
Dept: NEUROLOGY | Age: 31
End: 2021-07-15

## 2021-07-15 DIAGNOSIS — G37.9 CNS DEMYELINATION (HCC): Primary | ICD-10-CM

## 2021-07-20 ENCOUNTER — HOSPITAL ENCOUNTER (OUTPATIENT)
Dept: MRI IMAGING | Age: 31
Discharge: HOME | End: 2021-07-20
Attending: PSYCHIATRY & NEUROLOGY
Payer: MEDICAID

## 2021-07-20 DIAGNOSIS — G37.9 CNS DEMYELINATION (HCC): ICD-10-CM

## 2021-07-20 PROCEDURE — 72156 MRI NECK SPINE W/O & W/DYE: CPT

## 2021-07-20 PROCEDURE — A9576 INJ PROHANCE MULTIPACK: HCPCS | Performed by: PSYCHIATRY & NEUROLOGY

## 2021-07-20 PROCEDURE — 74011636320 HC RX REV CODE- 636/320: Performed by: PSYCHIATRY & NEUROLOGY

## 2021-07-20 PROCEDURE — 72157 MRI CHEST SPINE W/O & W/DYE: CPT

## 2021-07-20 RX ADMIN — GADOTERIDOL 18 ML: 279.3 INJECTION, SOLUTION INTRAVENOUS at 14:46

## 2021-07-25 NOTE — PROCEDURES
EEG:      Date:  07/15/2021    Requesting Physician:  Rosario Alonzo. MD Mary Alice    An EEG is requested in this 67-year-old lady to evaluate for epileptiform abnormality. Medications:  Medications are listed as including Valium, Retin-A. This tracing is obtained during the awake state. During wakefulness there are intermittent runs of posteriorly-dominant and symmetric low-to-medium amplitude 10 cycle per second activities which attenuate with eye opening. Lower-voltage faster-frequency activities are seen symmetrically over the anterior head regions. Hyperventilation is not performed due to COVID-19 precautions. Intermittent photic stimulation little alters the tracing. Sleep is not attained. Interpretation:   This EEG recorded during the awake state is normal.

## 2021-08-31 ENCOUNTER — OFFICE VISIT (OUTPATIENT)
Dept: NEUROLOGY | Age: 31
End: 2021-08-31
Payer: MEDICAID

## 2021-08-31 VITALS
HEART RATE: 76 BPM | OXYGEN SATURATION: 98 % | BODY MASS INDEX: 31.64 KG/M2 | RESPIRATION RATE: 16 BRPM | WEIGHT: 196 LBS | DIASTOLIC BLOOD PRESSURE: 77 MMHG | SYSTOLIC BLOOD PRESSURE: 126 MMHG

## 2021-08-31 DIAGNOSIS — G37.9 DEMYELINATING DISEASE OF CENTRAL NERVOUS SYSTEM (HCC): ICD-10-CM

## 2021-08-31 DIAGNOSIS — R90.89 ABNORMAL FINDING ON MRI OF BRAIN: ICD-10-CM

## 2021-08-31 DIAGNOSIS — G37.9 DEMYELINATING DISEASE OF CENTRAL NERVOUS SYSTEM (HCC): Primary | ICD-10-CM

## 2021-08-31 PROCEDURE — 99214 OFFICE O/P EST MOD 30 MIN: CPT | Performed by: PSYCHIATRY & NEUROLOGY

## 2021-08-31 NOTE — PROGRESS NOTES
[facility] Neurology Clinics and [address] at [facility] Neurology Clinics at [address] [facility], [address] [facility] [place]) [address] [facility], [address] [place] [address]   [phone]              Chief Complaint   Patient presents with    Results     MRIs    Head Pain     Current Outpatient Medications   Medication Sig Dispense Refill    tretinoin (RETIN-A) 0.1 % topical cream Apply  to affected area.  calcipotriene (DOVONEX) 0.005 % topical cream APPLY TO AFFECTED AREA(S) (FACE,ARMS AND HANDS) ONCE DAILY AS DIRECTED      TRINIDAD FE 1/20, 28, 1 mg-20 mcg (21)/75 mg (7) tab Take 1 Tab by mouth daily. 1      No Known Allergies  Social History     Tobacco Use    Smoking status: Never Smoker    Smokeless tobacco: Never Used   Vaping Use    Vaping Use: Never used   Substance Use Topics    Alcohol use: Yes     Alcohol/week: 0.0 standard drinks     Comment: 2x/month 1-2 drinks     Drug use: No     60-year-old lady returns today for follow-up after her initial consultation for complaints of sharp shooting pain in the head that I thought was likely an underlying migraine variant. We sent her for MRI and EEG  MRI of the brain is personally reviewed and this demonstrated 2 periventricular white matter lesions with enhancement suggestive of demyelination. 1 such lesion appears to arise out of the corpus callosum. Subsequent to that we sent her for MRI of the cervical and thoracic cord which were also reviewed and those are unremarkable. She had an EEG that was unremarkable. Today she reports continues to have the sharp pains at times. Still brief.   We discussed her past history and she has had times where she has had difficulty with muscular issues as she was told they were in the past.  She recounts 1 episode when she was younger and she could not move her leg and could not walk and she went to the hospital and they told her that she had a pulled muscle and gave her some muscle relaxers and she got better after about a day. She is never had any kind of focal deficit that has persisted for any prolonged period of time and then gotten better. Examination  Visit Vitals  /77 (BP 1 Location: Left upper arm, BP Patient Position: Sitting, BP Cuff Size: Adult)   Pulse 76   Resp 16   Wt 88.9 kg (196 lb)   SpO2 98%   BMI 31.64 kg/m²   She is a very pleasant lady who is engaging interactive with normal speech-language cognition      Impression/Plan  30-year-old lady with abnormal MRI scan suggestive of demyelination  Cervical films unremarkable  We will proceed with lumbar puncture to evaluate for demyelinating disease versus other etiology  We will see her back after    Tasia Rubio MD        This note was created using voice recognition software. Despite editing, there may be syntax errors.

## 2021-09-02 LAB
ALBUMIN MFR UR ELPH: 31.3 %
ALBUMIN SERPL ELPH-MCNC: 3.5 G/DL (ref 2.9–4.4)
ALBUMIN/GLOB SERPL: 1 {RATIO} (ref 0.7–1.7)
ALPHA1 GLOB MFR UR ELPH: 5.9 %
ALPHA1 GLOB SERPL ELPH-MCNC: 0.2 G/DL (ref 0–0.4)
ALPHA2 GLOB MFR UR ELPH: 10.5 %
ALPHA2 GLOB SERPL ELPH-MCNC: 0.8 G/DL (ref 0.4–1)
ANA SER QL: NEGATIVE
B-GLOBULIN MFR UR ELPH: 20.1 %
B-GLOBULIN SERPL ELPH-MCNC: 1.1 G/DL (ref 0.7–1.3)
CRP SERPL HS-MCNC: 9.98 MG/L (ref 0–3)
ENA SS-A AB SER-ACNC: <0.2 AI (ref 0–0.9)
ENA SS-B AB SER-ACNC: <0.2 AI (ref 0–0.9)
ERYTHROCYTE [SEDIMENTATION RATE] IN BLOOD BY WESTERGREN METHOD: 27 MM/HR (ref 0–32)
GAMMA GLOB MFR UR ELPH: 32.1 %
GAMMA GLOB SERPL ELPH-MCNC: 1.4 G/DL (ref 0.4–1.8)
GLOBULIN SER CALC-MCNC: 3.5 G/DL (ref 2.2–3.9)
M PROTEIN MFR UR ELPH: NORMAL %
M PROTEIN SERPL ELPH-MCNC: NORMAL G/DL
PLEASE NOTE, 011150: NORMAL
PLEASE NOTE:, 133800: NORMAL
PROT SERPL-MCNC: 7 G/DL (ref 6–8.5)
PROT UR-MCNC: 6.4 MG/DL

## 2021-09-14 ENCOUNTER — HOSPITAL ENCOUNTER (OUTPATIENT)
Dept: GENERAL RADIOLOGY | Age: 31
Discharge: HOME | End: 2021-09-14
Attending: PSYCHIATRY & NEUROLOGY
Payer: MEDICAID

## 2021-09-14 DIAGNOSIS — G37.9 DEMYELINATING DISEASE OF CENTRAL NERVOUS SYSTEM (HCC): ICD-10-CM

## 2021-09-14 LAB
APPEARANCE CSF: CLEAR
APPEARANCE CSF: CLEAR
COLOR CSF: COLORLESS
COLOR CSF: COLORLESS
CRYPTOC AG CSF QL IA: NEGATIVE
CRYPTOCOCCUS NEOFORMANS/GATTII, CRNEOG: NOT DETECTED
CYTOMEGALOVIRUS, CYMEG: NOT DETECTED
ENTEROVIRUS, ENTVIR: NOT DETECTED
ESCHERICHIA COLI K1, ECK1: NOT DETECTED
GLUCOSE CSF-MCNC: 56 MG/DL (ref 40–70)
HAEMOPHILUS INFLUENZAE, HAEFLU: NOT DETECTED
HERPES SIMPLEX VIRUS 2, HSIMV2: NOT DETECTED
HSV1 DNA CSF QL NAA+PROBE: NOT DETECTED
HUMAN HERPESVIRUS 6, HUHV6: NOT DETECTED
HUMAN PARECHOVIRUS, HUPARV: NOT DETECTED
INDIA INK PREP CSF: NORMAL
LISTERIA MONOCYTOGENES, LISMON: NOT DETECTED
NEISSERIA MENINGITIDIS, NEIMEN: NOT DETECTED
PROT CSF-MCNC: 22 MG/DL (ref 15–45)
RBC # CSF: 0 /CU MM
RBC # CSF: 1 /CU MM
SERVICE CMNT-IMP: NORMAL
STREPTOCOCCUS AGALACTIAE, SAGA: NOT DETECTED
STREPTOCOCCUS PNEUMONIAE, STRPNE: NOT DETECTED
TUBE # CSF: 1
TUBE # CSF: 2
TUBE # CSF: 2
TUBE # CSF: 4
VARICELLA ZOSTER VIRUS, VAZOVI: NOT DETECTED
WBC # CSF: 2 /CU MM (ref 0–5)
WBC # CSF: 2 /CU MM (ref 0–5)

## 2021-09-14 PROCEDURE — 77030014132 XR SPINAL PUNC LUMB DX

## 2021-09-14 PROCEDURE — 82945 GLUCOSE OTHER FLUID: CPT

## 2021-09-14 PROCEDURE — 86617 LYME DISEASE ANTIBODY: CPT

## 2021-09-14 PROCEDURE — 87483 CNS DNA AMP PROBE TYPE 12-25: CPT

## 2021-09-14 PROCEDURE — 89050 BODY FLUID CELL COUNT: CPT

## 2021-09-14 PROCEDURE — 87327 CRYPTOCOCCUS NEOFORM AG IA: CPT

## 2021-09-14 PROCEDURE — 84157 ASSAY OF PROTEIN OTHER: CPT

## 2021-09-14 PROCEDURE — 87798 DETECT AGENT NOS DNA AMP: CPT

## 2021-09-14 PROCEDURE — 87205 SMEAR GRAM STAIN: CPT

## 2021-09-14 PROCEDURE — 87015 SPECIMEN INFECT AGNT CONCNTJ: CPT

## 2021-09-14 PROCEDURE — 87210 SMEAR WET MOUNT SALINE/INK: CPT

## 2021-09-14 PROCEDURE — 82040 ASSAY OF SERUM ALBUMIN: CPT

## 2021-09-14 NOTE — DISCHARGE INSTRUCTIONS
[facility]  Special Procedures/Radiology Department    Radiologist:  Alice Ulloa    Date:  September 14, 2021      Lumbar Puncture Discharge Instructions    Remain flat in bed or on the sofa for the next 24 hours. Drink plenty of water over the next 24 to 48 hours. Avoid caffeine products. Resume your previous diet and follow the medication reconciliation form. You make take Tylenol, as directed on the label, for pain or headache. Do not take aspirin or ibuprofen (Motrin or Advil) for the next 48 hours as it may cause you to bleed. Restrict your activity for the next 24 to 48 hours. No strenuous work or activities for the next 24 to 48 hours. Follow up with your referring physician for test results. If you have any questions or concerns, call [phone] and ask to speak to the nurse on-call.     Other:

## 2021-09-16 LAB
ALB CSF/SERPL: 2 {RATIO} (ref 0–8)
ALBUMIN CSF-MCNC: 9 MG/DL (ref 7–29)
ALBUMIN SERPL-MCNC: 4 G/DL (ref 3.9–5)
IGG CSF-MCNC: 1.8 MG/DL (ref 0–6.7)
IGG SERPL-MCNC: 1338 MG/DL (ref 586–1602)
IGG SYNTH RATE SER+CSF CALC-MRATE: NORMAL MG/DAY
IGG/ALB CLEAR SER+CSF-RTO: 0.6 (ref 0–0.7)
IGG/ALB CSF: 0.2 {RATIO} (ref 0–0.25)
OLIGOCLONAL BANDS.IT SER+CSF QL: NORMAL

## 2021-09-17 ENCOUNTER — TELEPHONE (OUTPATIENT)
Dept: NEUROLOGY | Age: 31
End: 2021-09-17

## 2021-09-17 DIAGNOSIS — M54.2 NECK PAIN: Primary | ICD-10-CM

## 2021-09-17 LAB — JC VIRUS DNA BY PCR, 139329: NEGATIVE

## 2021-09-17 NOTE — TELEPHONE ENCOUNTER
----- Message from Ernestina Dunaway sent at 9/17/2021  3:07 PM EDT -----  Regarding: Dr. Terri Siu Message/Vendor Calls    Caller's first and last name: Self      Reason for call: ER      Callback required yes/no and why: Yes the patient is in pain      Best contact number(s): [phone]      Details to clarify the request: The patient needs to know if she should go to the ER. Please give her a call back in regards to the conversation early. She said the nurse was supposed to talk to the doctor and call her back.  Thanks      Ernestina Dunaway

## 2021-09-17 NOTE — TELEPHONE ENCOUNTER
Pt states she has a lot of neck pain almost like spasms that were not present prior to LP that was done a [facility]. Has dull HA but very intense when standing rated 8/10. Has eased slightly but still present. Pt went to ED this weekend and had blood patch performed. HAs are gone but still has a lot of back pain.   Would like to know if Dr. Festus Cantu has any recommendations

## 2021-09-17 NOTE — TELEPHONE ENCOUNTER
Patient had a spinal tap done this past Tuesday. She is still having headaches and her neck/back are sore.

## 2021-09-21 LAB
B BURGDOR IGG PATRN CSF IB-IMP: NEGATIVE
B BURGDOR IGM PATRN CSF IB-IMP: NEGATIVE
B BURGDOR18KD IGG CSF QL IB: NORMAL
B BURGDOR23KD IGG CSF QL IB: NORMAL
B BURGDOR23KD IGM CSF QL IB: NORMAL
B BURGDOR28KD IGG CSF QL IB: NORMAL
B BURGDOR30KD IGG CSF QL IB: NORMAL
B BURGDOR39KD IGG CSF QL IB: NORMAL
B BURGDOR39KD IGM CSF QL IB: NORMAL
B BURGDOR41KD IGG CSF QL IB: NORMAL
B BURGDOR41KD IGM CSF QL IB: NORMAL
B BURGDOR45KD IGG CSF QL IB: NORMAL
B BURGDOR58KD IGG CSF QL IB: NORMAL
B BURGDOR66KD IGG CSF QL IB: NORMAL
B BURGDOR93KD IGG CSF QL IB: NORMAL
BACTERIA SPEC CULT: NORMAL
GRAM STN SPEC: NORMAL
GRAM STN SPEC: NORMAL
SERVICE CMNT-IMP: NORMAL

## 2021-09-23 RX ORDER — CYCLOBENZAPRINE HCL 10 MG
10 TABLET ORAL
Qty: 30 TABLET | Refills: 0 | Status: SHIPPED | OUTPATIENT
Start: 2021-09-23 | End: 2022-02-06

## 2021-10-11 ENCOUNTER — OFFICE VISIT (OUTPATIENT)
Dept: NEUROLOGY | Age: 31
End: 2021-10-11
Payer: MEDICAID

## 2021-10-11 VITALS
BODY MASS INDEX: 32.78 KG/M2 | DIASTOLIC BLOOD PRESSURE: 82 MMHG | HEIGHT: 66 IN | RESPIRATION RATE: 16 BRPM | WEIGHT: 204 LBS | SYSTOLIC BLOOD PRESSURE: 118 MMHG

## 2021-10-11 DIAGNOSIS — R90.89 ABNORMAL FINDING ON MRI OF BRAIN: Primary | ICD-10-CM

## 2021-10-11 DIAGNOSIS — G43.009 MIGRAINE WITHOUT AURA AND WITHOUT STATUS MIGRAINOSUS, NOT INTRACTABLE: ICD-10-CM

## 2021-10-11 PROCEDURE — 99214 OFFICE O/P EST MOD 30 MIN: CPT | Performed by: PSYCHIATRY & NEUROLOGY

## 2021-10-11 NOTE — PROGRESS NOTES
[facility] Neurology Clinics and [address] at [facility] Neurology Clinics at [address] [facility], [address] [facility] [address] [facility], [address] [place] [address]   [phone]              Chief Complaint   Patient presents with    Follow-up     Post lumbar puncture      Current Outpatient Medications   Medication Sig Dispense Refill    cyclobenzaprine (FLEXERIL) 10 mg tablet Take 1 Tablet by mouth three (3) times daily as needed for Muscle Spasm(s). 30 Tablet 0    tretinoin (RETIN-A) 0.1 % topical cream Apply  to affected area.  calcipotriene (DOVONEX) 0.005 % topical cream APPLY TO AFFECTED AREA(S) (FACE,ARMS AND HANDS) ONCE DAILY AS DIRECTED      TRINIDAD FE 1/20, 28, 1 mg-20 mcg (21)/75 mg (7) tab Take 1 Tab by mouth daily. 1      No Known Allergies  Social History     Tobacco Use    Smoking status: Never Smoker    Smokeless tobacco: Never Used   Vaping Use    Vaping Use: Never used   Substance Use Topics    Alcohol use: Yes     Alcohol/week: 0.0 standard drinks     Comment: 2x/month 1-2 drinks     Drug use: No     71-year-old lady who returns today for follow-up. I saw her for complaints of shooting pain in her head that I thought was a migraine variant. She had an MRI that had 2 periventricular white matter lesions raising the question of demyelinating disease. Because of that we sent her for lumbar puncture. She had post LP headache and had to go to the emergency department to get a blood patch. CSF analysis demonstrates:  [place] ink negative  Culture no growth  Meningitis pathogens not present  CSF RBC 1, 0  CSF WBC 2, 2  MS panel completely normal.  CSF glucose 56  CSF protein 22  Lyme negative  Cryptococcal antigen negative  CARLENE virus negative    Today she reports she has had no new symptom. She is feeling much better after the blood patch.   We discussed today that her lumbar puncture did not demonstrate any abnormality suggestive of MS and these white matter lesions may just be nonspecific. She still having the migraine type headaches. Can be brought on by lack of sleep she thinks. Examination  Visit Vitals  Resp 16   Ht 5' 6\" (1.676 m)   BMI 31.64 kg/m²     Visit Vitals  /82 (BP 1 Location: Left upper arm, BP Patient Position: Sitting)   Resp 16   Ht 5' 6\" (1.676 m)   Wt 92.5 kg (204 lb)   LMP 10/07/2021 (Approximate)   BMI 32.93 kg/m²     She is a very pleasant lady. She is awake alert oriented and conversant. Normal speech and language. Normal cognition. No ataxia    Impression/Plan  Abnormal MRI brain  No evidence of MS by spinal cord MRI as well as lumbar puncture  Repeat MRI of the brain in 6 months to ensure stability and no new lesion accumulation    Migraines  Try Ubrelvy and track headaches  Follow-up in 2 months and depending on her headache frequency we may wish to use a preventative    Benjamin Cordova MD        This note was created using voice recognition software. Despite editing, there may be syntax errors.

## 2021-12-23 ENCOUNTER — OFFICE VISIT (OUTPATIENT)
Dept: INTERNAL MEDICINE CLINIC | Age: 31
End: 2021-12-23
Payer: MEDICAID

## 2021-12-23 DIAGNOSIS — L30.8 SPONGIOTIC PSORIASIFORM DERMATITIS: ICD-10-CM

## 2021-12-23 DIAGNOSIS — E66.01 SEVERE OBESITY (BMI 35.0-35.9 WITH COMORBIDITY) (HCC): ICD-10-CM

## 2021-12-23 DIAGNOSIS — Z00.00 PHYSICAL EXAM: Primary | ICD-10-CM

## 2021-12-23 PROCEDURE — 99213 OFFICE O/P EST LOW 20 MIN: CPT | Performed by: INTERNAL MEDICINE

## 2021-12-23 PROCEDURE — 99385 PREV VISIT NEW AGE 18-39: CPT | Performed by: INTERNAL MEDICINE

## 2021-12-23 NOTE — PROGRESS NOTES
1. Have you been to the ER, urgent care clinic since your last visit? Hospitalized since your last visit? No    2. Have you seen or consulted any other health care providers outside of the [address] [place] [address] since your last visit? Include any pap smears or colon screening.  No

## 2021-12-24 LAB
ALBUMIN SERPL-MCNC: 4.2 G/DL (ref 3.9–5)
ALBUMIN/GLOB SERPL: 1.4 {RATIO} (ref 1.2–2.2)
ALP SERPL-CCNC: 57 IU/L (ref 44–121)
ALT SERPL-CCNC: 24 IU/L (ref 0–32)
APPEARANCE UR: CLEAR
AST SERPL-CCNC: 18 IU/L (ref 0–40)
BASOPHILS # BLD AUTO: 0 X10E3/UL (ref 0–0.2)
BASOPHILS NFR BLD AUTO: 0 %
BILIRUB SERPL-MCNC: 0.5 MG/DL (ref 0–1.2)
BILIRUB UR QL STRIP: NEGATIVE
BUN SERPL-MCNC: 8 MG/DL (ref 6–20)
BUN/CREAT SERPL: 13 (ref 9–23)
CALCIUM SERPL-MCNC: 8.8 MG/DL (ref 8.7–10.2)
CHLORIDE SERPL-SCNC: 103 MMOL/L (ref 96–106)
CHOLEST SERPL-MCNC: 206 MG/DL (ref 100–199)
CO2 SERPL-SCNC: 22 MMOL/L (ref 20–29)
COLOR UR: YELLOW
CREAT SERPL-MCNC: 0.62 MG/DL (ref 0.57–1)
EOSINOPHIL # BLD AUTO: 0 X10E3/UL (ref 0–0.4)
EOSINOPHIL NFR BLD AUTO: 1 %
ERYTHROCYTE [DISTWIDTH] IN BLOOD BY AUTOMATED COUNT: 11.8 % (ref 11.7–15.4)
GLOBULIN SER CALC-MCNC: 3 G/DL (ref 1.5–4.5)
GLUCOSE SERPL-MCNC: 82 MG/DL (ref 65–99)
GLUCOSE UR QL: NEGATIVE
HCT VFR BLD AUTO: 40 % (ref 34–46.6)
HDLC SERPL-MCNC: 51 MG/DL
HGB BLD-MCNC: 13.9 G/DL (ref 11.1–15.9)
HGB UR QL STRIP: NEGATIVE
IMM GRANULOCYTES # BLD AUTO: 0 X10E3/UL (ref 0–0.1)
IMM GRANULOCYTES NFR BLD AUTO: 0 %
KETONES UR QL STRIP: NEGATIVE
LDLC SERPL CALC-MCNC: 146 MG/DL (ref 0–99)
LEUKOCYTE ESTERASE UR QL STRIP: NEGATIVE
LYMPHOCYTES # BLD AUTO: 1.7 X10E3/UL (ref 0.7–3.1)
LYMPHOCYTES NFR BLD AUTO: 28 %
MCH RBC QN AUTO: 30.5 PG (ref 26.6–33)
MCHC RBC AUTO-ENTMCNC: 34.8 G/DL (ref 31.5–35.7)
MCV RBC AUTO: 88 FL (ref 79–97)
MICRO URNS: NORMAL
MONOCYTES # BLD AUTO: 0.4 X10E3/UL (ref 0.1–0.9)
MONOCYTES NFR BLD AUTO: 7 %
NEUTROPHILS # BLD AUTO: 4 X10E3/UL (ref 1.4–7)
NEUTROPHILS NFR BLD AUTO: 64 %
NITRITE UR QL STRIP: NEGATIVE
PH UR STRIP: 7.5 [PH] (ref 5–7.5)
PLATELET # BLD AUTO: 284 X10E3/UL (ref 150–450)
POTASSIUM SERPL-SCNC: 4.4 MMOL/L (ref 3.5–5.2)
PROT SERPL-MCNC: 7.2 G/DL (ref 6–8.5)
PROT UR QL STRIP: NEGATIVE
RBC # BLD AUTO: 4.56 X10E6/UL (ref 3.77–5.28)
SODIUM SERPL-SCNC: 138 MMOL/L (ref 134–144)
SP GR UR: 1.02 (ref 1–1.03)
TRIGL SERPL-MCNC: 50 MG/DL (ref 0–149)
UROBILINOGEN UR STRIP-MCNC: 1 MG/DL (ref 0.2–1)
VLDLC SERPL CALC-MCNC: 9 MG/DL (ref 5–40)
WBC # BLD AUTO: 6.2 X10E3/UL (ref 3.4–10.8)

## 2021-12-27 PROBLEM — E66.01 SEVERE OBESITY (BMI 35.0-35.9 WITH COMORBIDITY) (HCC): Status: ACTIVE | Noted: 2021-12-27

## 2021-12-27 PROBLEM — E78.5 DYSLIPIDEMIA: Status: ACTIVE | Noted: 2021-12-27

## 2021-12-28 VITALS
OXYGEN SATURATION: 98 % | SYSTOLIC BLOOD PRESSURE: 136 MMHG | DIASTOLIC BLOOD PRESSURE: 82 MMHG | TEMPERATURE: 98 F | RESPIRATION RATE: 18 BRPM | HEART RATE: 79 BPM | HEIGHT: 66 IN | WEIGHT: 216.9 LBS | BODY MASS INDEX: 34.86 KG/M2

## 2021-12-28 NOTE — PROGRESS NOTES
[facility]  [place] [address]  [address] [address] [facility] [address]  Phone:  [phone]  Fax: [phone]       Chief Complaint   Patient presents with   [address]   . SUBJECTIVE:    Brianna Alejandro is a 28 y.o. female comes in for a physical examination. She needs to have a Pap smear. She has skin grafting just below her lids bilaterally, left greater than the right. Current Outpatient Medications   Medication Sig Dispense Refill    cyclobenzaprine (FLEXERIL) 10 mg tablet Take 1 Tablet by mouth three (3) times daily as needed for Muscle Spasm(s). 30 Tablet 0    tretinoin (RETIN-A) 0.1 % topical cream Apply  to affected area.  calcipotriene (DOVONEX) 0.005 % topical cream APPLY TO AFFECTED AREA(S) (FACE,ARMS AND HANDS) ONCE DAILY AS DIRECTED      ubrogepant (UBRELVY) 100 mg tablet 1 at HA onset and repeat in 2 hours x 1 prn  Max 2 tabs/24 hours 10 Tablet 3    TRINIDAD FE 1/20, 28, 1 mg-20 mcg (21)/75 mg (7) tab Take 1 Tab by mouth daily. 1     Past Medical History:   Diagnosis Date    ADD (attention deficit disorder)     Allergic rhinitis     Anxiety     Chronic UTI     Hearing loss     dx as a child - never treated.      [facility]     Past Surgical History:   Procedure Laterality Date    HX FREE SKIN GRAFT Left     left eye     No Known Allergies      REVIEW OF SYSTEMS:  General: negative for - chills or fever  ENT: negative for - headaches, nasal congestion or tinnitus  Respiratory: negative for - cough, hemoptysis, shortness of breath or wheezing  Cardiovascular : negative for - chest pain, edema, palpitations or shortness of breath  Gastrointestinal: negative for - abdominal pain, blood in stools, heartburn or nausea/vomiting  Genito-Urinary: no dysuria, trouble voiding, or hematuria  Musculoskeletal: negative for - gait disturbance, joint pain, joint stiffness or joint swelling  Neurological: no TIA or stroke symptoms  Hematologic: no bruises, no bleeding, no swollen glands  Integument: no lumps, mole changes, nail changes or rash  Endocrine: no malaise/lethargy or unexpected weight changes      Social History     Socioeconomic History    Marital status: SINGLE    Number of children: 2    Years of education: 16+    Highest education level: Bachelor's degree (e.g., BA, AB, BS)   Occupational History    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[employer] [address] [address] [employer] [address]: Graduating in 2019    Occupation: [employer]     Occupation: [employer]    Smoking status: Never Smoker    Smokeless tobacco: Never Used   Vaping Use    Vaping Use: Never used   Substance and Sexual Activity    Alcohol use: Yes     Alcohol/week: 0.0 standard drinks     Comment: occasional    Drug use: No    Sexual activity: Yes     Partners: Male     Birth control/protection: Pill   Other Topics Concern    Exercise No     Comment: planning to start   Social History Narrative    Adopted - biological mother was drug addict/AIDS patient - passed away. Son is 12 yo in 2017.    3 yo son. Lives with sons. Graduated [employer]     Family History   Adopted: Yes   Problem Relation Age of Onset    Drug Abuse Mother     Stroke Father 61       OBJECTIVE:    Visit Vitals  /82   Pulse 79   Temp 98 °F (36.7 °C) (Oral)   Resp 18   Ht 5' 6\" (1.676 m)   Wt 216 lb 14.4 oz (98.4 kg)   SpO2 98%   BMI 35.01 kg/m²     CONSTITUTIONAL: well , well nourished, appears age appropriate  EYES: perrla, eom intact  ENMT:moist mucous membranes, pharynx clear  NECK: supple.  Thyroid normal  RESPIRATORY: Chest: clear to ascultation and percussion   CARDIOVASCULAR: Heart: regular rate and rhythm  GASTROINTESTINAL: Abdomen: soft, bowel sounds active  HEMATOLOGIC: no pathological lymph nodes palpated  MUSCULOSKELETAL: Extremities: no edema, pulse 1+   INTEGUMENT: No unusual rashes or suspicious skin lesions noted. Nails appear normal.  NEUROLOGIC: non-focal exam   MENTAL STATUS: alert and oriented, appropriate affect      ASSESSMENT:  1. Physical exam    2. Spongiotic psoriasiform dermatitis    3. Severe obesity (BMI 35.0-35.9 with comorbidity) ([address]        PLAN:  1. The patient is quite healthy. 2. She really needs to lose weight. This can be accomplished by eating meals, eliminating snacks, and avoiding the consumption of processed carbohydrates. 3. The patient will return to the office for Pap smear by Dr. Philip Krueger.     .  Orders Placed This Encounter    CBC WITH AUTOMATED DIFF    LIPID PANEL    METABOLIC PANEL, COMPREHENSIVE    URINALYSIS W/ RFLX MICROSCOPIC         Follow-up and Dispositions    · Return in about 6 months (around 6/23/2022), or RTO 3 weeks to see  for Gyn eval.           Hermes Carter MD

## 2021-12-30 ENCOUNTER — TELEPHONE (OUTPATIENT)
Dept: INTERNAL MEDICINE CLINIC | Age: 31
End: 2021-12-30

## 2021-12-30 NOTE — TELEPHONE ENCOUNTER
----- Message from Jolanta Hawkins MD sent at 12/26/2021  4:40 PM EST -----  Send copy of labs to patient with a note stating that the labs are excellent

## 2022-02-01 ENCOUNTER — OFFICE VISIT (OUTPATIENT)
Dept: INTERNAL MEDICINE CLINIC | Age: 32
End: 2022-02-01
Payer: MEDICAID

## 2022-02-01 VITALS
DIASTOLIC BLOOD PRESSURE: 89 MMHG | BODY MASS INDEX: 35.77 KG/M2 | HEIGHT: 66 IN | OXYGEN SATURATION: 99 % | HEART RATE: 78 BPM | SYSTOLIC BLOOD PRESSURE: 132 MMHG | RESPIRATION RATE: 16 BRPM | WEIGHT: 222.6 LBS

## 2022-02-01 DIAGNOSIS — Z12.4 CERVICAL CANCER SCREENING: Primary | ICD-10-CM

## 2022-02-01 PROCEDURE — S0612 ANNUAL GYNECOLOGICAL EXAMINA: HCPCS | Performed by: FAMILY MEDICINE

## 2022-02-01 NOTE — PATIENT INSTRUCTIONS
Learning About Cervical Cancer Screening  What is a cervical cancer screening test?     The cervix is the lower part of the uterus that opens into the vagina. Cervical cancer screening tests check the cells on the cervix for changes that could lead to cancer. Two tests can be used to screen for cervical cancer. They may be used alone or together. A Pap test.   This test looks for changes in the cells of the cervix. Some of these cell changes could lead to cancer. A human papillomavirus (HPV) test.   This test looks for the HPV virus. Some high-risk types of HPV can cause cell changes that could lead to cervical cancer. When should you have a screening test?  If you have a cervix, you may need cervical cancer screening. Screening will depend on many things. Ages 24 to 34  Screening options for these ages include:  · A Pap test. If your results are normal, you can wait 3 years to have another test.  · An HPV test beginning at age 22. If your results are negative, you can wait 5 years to have another test.  Ages 27 to 59  Screening options for these ages include:  · A Pap test. If your results are normal, you can wait 3 years to have another test.  · An HPV test. If your results are negative, you can wait 5 years to have another test.  · A Pap test and an HPV test. If your results are normal, you can wait 5 years to be tested again. Ages 72 and older  If you are age 72 or older and you've always had normal screening results, you may not need screening. Talk to your doctor. What do the results of cervical cancer screening mean? Your test results may be normal. Or the results may show minor or serious changes to the cells on your cervix. Minor changes may go away on their own, especially if you are younger than 30. You may have an abnormal test because you have an infection of the vagina or cervix or because you have low estrogen levels after menopause that are causing the cells to change.   If you have a high-risk type of human papillomavirus (HPV) or cell changes that could turn into cancer, you may need more tests. Your doctor may suggest that you wait to be retested. Or you may need to have a colposcopy or treatment right away. Your doctor will recommend a follow-up plan based on your results and your age. Follow-up care is a key part of your treatment and safety. Be sure to make and go to all appointments, and call your doctor if you are having problems. It's also a good idea to know your test results and keep a list of the medicines you take. Where can you learn more? Go to http://www.[employer].com/  Enter P919 in the search box to learn more about \"Learning About Cervical Cancer Screening. \"  Current as of: December 17, 2020               Content Version: 13.0  © 8288-2785 Healthwise, Incorporated. Care instructions adapted under license by [employer] (which disclaims liability or warranty for this information). If you have questions about a medical condition or this instruction, always ask your healthcare professional. [address] any warranty or liability for your use of this information.

## 2022-02-01 NOTE — PROGRESS NOTES
Chief Complaint   Patient presents with    Well Woman     Patient is here for a pap smear. 1. Have you been to the ER, urgent care clinic since your last visit? Hospitalized since your last visit? No    2. Have you seen or consulted any other health care providers outside of the [address] [place] [address] since your last visit? Include any pap smears or colon screening.  No

## 2022-02-01 NOTE — PROGRESS NOTES
[facility]  Rukhsana Pedroza MD  [address] [address]    Chief Complaint   Patient presents with    Well Woman     Patient is here for a pap smear. SUBJECTIVE:    Ava Robbins is a 28 y.o. female for pap smear. Pt had a CPE with her personal physician within 4-5 weeks. LMP : 22 (approx)  Postmenopausal bleeding: NA  Menstrual irregularity: no  Abnormal pap history: 3 yr ago  Last pap smear:   Abnormal mammogram history: no  Last mammogram date: na  Colonoscopy:na   DEXA scan and T-score: no prior study  GYN/ diagnoses: no  GYN/ surgery:no  Family hx of female cancer: no  Gravity/parity: [id number]  x2  Contraception:  ocp  STI history: remote  Menarche:14 yo  Sexually active: not currently  Condoms: yes  Vaginitis:, recurring BV history  Urinary complaints: Saw urogyne for frequent utis (OAB, pelvic floor issues)    Current Outpatient Medications   Medication Sig Dispense Refill    cyclobenzaprine (FLEXERIL) 10 mg tablet Take 1 Tablet by mouth three (3) times daily as needed for Muscle Spasm(s). 30 Tablet 0    tretinoin (RETIN-A) 0.1 % topical cream Apply  to affected area.  calcipotriene (DOVONEX) 0.005 % topical cream APPLY TO AFFECTED AREA(S) (FACE,ARMS AND HANDS) ONCE DAILY AS DIRECTED      ubrogepant (UBRELVY) 100 mg tablet 1 at HA onset and repeat in 2 hours x 1 prn  Max 2 tabs/24 hours 10 Tablet 3    TRINIDAD FE , 28, 1 mg-20 mcg (21)/75 mg (7) tab Take 1 Tab by mouth daily. 1     Past Medical History:   Diagnosis Date    ADD (attention deficit disorder)     Allergic rhinitis     Anxiety     Chronic UTI     Hearing loss     dx as a child - never treated.      [facility] 2019     Past Surgical History:   Procedure Laterality Date    HX FREE SKIN GRAFT Left     left eye     No Known Allergies    REVIEW OF SYSTEMS:  General: negative for - chills or fever  ENT: negative for - headaches, nasal congestion, tinnitus, hearing loss, vision changes, sore throat  Respiratory: negative for - cough, hemoptysis, shortness of breath or wheezing  Cardiovascular : negative for - chest pain, edema, palpitations or shortness of breath  Gastrointestinal: negative for - abdominal pain, blood in stools, heartburn or nausea/vomiting, diarrhea, constipation  Genito-Urinary: no dysuria, trouble voiding, hematuria or erectile dysfunction  Musculoskeletal: negative for - gait disturbance, joint pain, joint stiffness , joint swelling, muscle aches  Neurological: no TIA or stroke symptoms  Hematologic: no bruises, no bleeding, no swollen glands  Integument: no lumps, mole changes, nail changes or rash  Endocrine:no malaise/lethargy or unexpected weight changes      Social History     Socioeconomic History    Marital status: SINGLE    Number of children: 2    Years of education: 16+    Highest education level: Bachelor's degree (e.g., BA, AB, BS)   Occupational History    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[employer] [address]: Graduating in 2019    Occupation: [employer]     Occupation: [employer]    Smoking status: Never Smoker    Smokeless tobacco: Never Used   Vaping Use    Vaping Use: Never used   Substance and Sexual Activity    Alcohol use: Yes     Alcohol/week: 0.0 standard drinks     Comment: occasional    Drug use: No    Sexual activity: Yes     Partners: Male     Birth control/protection: Pill   Other Topics Concern    Exercise No     Comment: planning to start   Social History Narrative    Adopted - biological mother was drug addict/AIDS patient - passed away. Son is 10 yo in 2017.    3 yo son. Lives with sons.     Graduated [employer]     Family History   Adopted: Yes   Problem Relation Age of Onset    Drug Abuse Mother     Stroke Father 61       OBJECTIVE:     Visit Vitals  /89   Pulse 78   Resp 16   Ht 5' 6\" (1.676 m)   Wt 222 lb 9.6 oz (101 kg)   SpO2 99%   BMI 35.93 kg/m²     General appearance - alert, well appearing, and in no distress  Mental status - alert, oriented to person, place, and time, normal mood, behavior, speech, dress, motor activity, and thought processes  Abdomen - soft, nontender, nondistended, no masses or organomegaly  Breasts - breasts appear normal, no suspicious masses, no skin or nipple changes or axillary nodes   Pelvic - normal external genitalia, vulva, vagina, cervix, uterus and adnexa     ASSESSMENT:   1. Cervical cancer screening        PLAN:  .  Orders Placed This Encounter    PAP IG, CT-NG-TV, APTIMA HPV AND [address] 60/33,67([id number])     I have discussed the diagnosis with the patient and the intended plan as seen in the  orders above. The patient understands and agrees with the plan. The patient has   received an after visit summary. Questions were answered concerning  future plans  Patient labs and/or xrays were reviewed as available. Past records were reviewed as available. Counseled regarding  healthy lifestyle       SignedSabino M.D. This note was created using voice recognition software.   Edits have been made but syntax errors might exist.

## 2022-02-07 LAB
C TRACH RRNA CVX QL NAA+PROBE: NEGATIVE
CYTOLOGIST CVX/VAG CYTO: NORMAL
CYTOLOGY CVX/VAG DOC CYTO: NORMAL
CYTOLOGY CVX/VAG DOC THIN PREP: NORMAL
DX ICD CODE: NORMAL
HPV I/H RISK 4 DNA CVX QL PROBE+SIG AMP: NEGATIVE
Lab: NORMAL
Lab: NORMAL
N GONORRHOEA RRNA CVX QL NAA+PROBE: NEGATIVE
OTHER STN SPEC: NORMAL
SPECIMEN STATUS REPORT, ROLRST: NORMAL
STAT OF ADQ CVX/VAG CYTO-IMP: NORMAL
T VAGINALIS RRNA SPEC QL NAA+PROBE: NEGATIVE

## 2022-02-11 DIAGNOSIS — B96.89 BV (BACTERIAL VAGINOSIS): Primary | ICD-10-CM

## 2022-02-11 DIAGNOSIS — N76.0 BV (BACTERIAL VAGINOSIS): Primary | ICD-10-CM

## 2022-02-11 RX ORDER — METRONIDAZOLE 7.5 MG/G
1 GEL VAGINAL 2 TIMES DAILY
Qty: 50 G | Refills: 0 | Status: SHIPPED | OUTPATIENT
Start: 2022-02-11 | End: 2022-02-16

## 2022-03-18 PROBLEM — L30.8 SPONGIOTIC PSORIASIFORM DERMATITIS: Status: ACTIVE | Noted: 2021-06-14

## 2022-03-18 PROBLEM — I47.1 PAROXYSMAL SVT (SUPRAVENTRICULAR TACHYCARDIA) (HCC): Status: ACTIVE | Noted: 2019-12-02

## 2022-03-18 PROBLEM — I47.10 PAROXYSMAL SVT (SUPRAVENTRICULAR TACHYCARDIA): Status: ACTIVE | Noted: 2019-12-02

## 2022-03-19 PROBLEM — E78.5 DYSLIPIDEMIA: Status: ACTIVE | Noted: 2021-12-27

## 2022-03-19 PROBLEM — E66.01 SEVERE OBESITY (BMI 35.0-35.9 WITH COMORBIDITY) (HCC): Status: ACTIVE | Noted: 2021-12-27

## 2022-03-19 PROBLEM — Z79.899 LONG-TERM CURRENT USE OF ISOTRETINOIN: Status: ACTIVE | Noted: 2020-09-10

## 2022-04-12 ENCOUNTER — HOSPITAL ENCOUNTER (OUTPATIENT)
Dept: MRI IMAGING | Age: 32
Discharge: HOME | End: 2022-04-12
Attending: PSYCHIATRY & NEUROLOGY
Payer: COMMERCIAL

## 2022-04-12 DIAGNOSIS — R90.89 ABNORMAL FINDING ON MRI OF BRAIN: ICD-10-CM

## 2022-04-12 PROCEDURE — A9576 INJ PROHANCE MULTIPACK: HCPCS | Performed by: PSYCHIATRY & NEUROLOGY

## 2022-04-12 PROCEDURE — 70553 MRI BRAIN STEM W/O & W/DYE: CPT

## 2022-04-12 PROCEDURE — 74011250636 HC RX REV CODE- 250/636: Performed by: PSYCHIATRY & NEUROLOGY

## 2022-04-12 RX ADMIN — GADOTERIDOL 20 ML: 279.3 INJECTION, SOLUTION INTRAVENOUS at 09:59

## 2022-04-20 ENCOUNTER — TELEPHONE (OUTPATIENT)
Dept: NEUROLOGY | Age: 32
End: 2022-04-20

## 2022-04-20 NOTE — TELEPHONE ENCOUNTER
S/w pt, discussed results and appt that [facility] had scheduled for pt this Friday at 0900 for VV.   Pt will see [facility] then

## 2022-04-20 NOTE — TELEPHONE ENCOUNTER
----- Message from Tray Sampson MD sent at 4/20/2022  9:25 AM EDT -----  Pt has a new lesion on brain MRI  With this new lesion even though LP nml, looks like MS  Have her see Desmond Campos to discuss medication  ----- Message -----  From: Zander Rojas Results In  Sent: 4/12/2022   1:17 PM EDT  To: Tray Sampson MD

## 2022-04-22 ENCOUNTER — VIRTUAL VISIT (OUTPATIENT)
Dept: NEUROLOGY | Age: 32
End: 2022-04-22
Payer: COMMERCIAL

## 2022-04-22 DIAGNOSIS — G35 RELAPSING REMITTING MULTIPLE SCLEROSIS (HCC): Primary | ICD-10-CM

## 2022-04-22 DIAGNOSIS — G35 EXACERBATION OF MULTIPLE SCLEROSIS (HCC): ICD-10-CM

## 2022-04-22 PROCEDURE — 99214 OFFICE O/P EST MOD 30 MIN: CPT | Performed by: NURSE PRACTITIONER

## 2022-04-22 RX ORDER — METHYLPREDNISOLONE SODIUM SUCCINATE 1 G/16ML
1000 INJECTION, POWDER, LYOPHILIZED, FOR SOLUTION INTRAMUSCULAR; INTRAVENOUS DAILY
Qty: 3000 MG | Refills: 0 | Status: SHIPPED | OUTPATIENT
Start: 2022-04-22 | End: 2022-04-25

## 2022-04-22 NOTE — PROGRESS NOTES
Malika Cross is a 28 y.o. female who was seen by synchronous (real-time) audio-video technology on 4/22/2022 for No chief complaint on file. Assessment & Plan:   Diagnoses and all orders for this visit:    1. Relapsing remitting multiple sclerosis (HCC)  -     HEP B SURFACE AB; Future  -     HEPATITIS B CORE AB, TOTAL; Future    2. Exacerbation of multiple sclerosis (HCC)  -     methylPREDNISolone (SOLU-MedroL) 1,000 mg injection; 1,000 mg by IntraVENous route daily for 3 doses. MRI changes based on serial imaging and new enhancing lesion, will set her up for three days of IV solumedrol. In the meantime, we discussed multiple sclerosis to include diagnosis, diagnostic criteria, pathophysiology, prognosis, progression, and treatment. Discussed goals of treatment at length. We discussed disease modifying therapy goals are to decrease annualized relapse rate, development of new ALONDRA enhancing lesions, and to prevent increased prominence of existing lesions. Discussed treatment of MS symptoms would be in addition to disease modifying therapy. We discussed the monitoring of the disease with serial imaging. After a long discussion regarding her diagnosis and DMT options, she has elected to go with [facility]. She will be sent for baseline studies prior to therapy initiation. Will plan for new baseline MRI at six months following therapy initiation. Subjective: This is a follow up for newly diagnosed RRMS. She was originally seen by Dr. Tanja Cronin due to having sharp pains in the right side of her head. As this was a new onset headache, she did have MRI of the brain which demonstrated abnormal white matte enhancement. She was then sent for a diagnostic LP with CSF analysis which was normal. MRI of the spine was also normal. Six month repeat MRI of the brain demonstrated one new enhancing lesion. Left hand will start shaking which has been in the last month or so.     Sharp pains in the right side of her head which resolved. She does have some fatigue, bladder frequency, and heat intolerance at baseline. No other neurological deficits. Prior to Admission medications    Medication Sig Start Date End Date Taking? Authorizing Provider   methylPREDNISolone (SOLU-MedroL) 1,000 mg injection 1,000 mg by IntraVENous route daily for 3 doses. 4/22/22 4/25/22 Yes Katie HUSTON NP   tretinoin (RETIN-A) 0.1 % topical cream Apply  to affected area. 9/10/20   Provider, Historical   calcipotriene (DOVONEX) 0.005 % topical cream APPLY TO AFFECTED AREA(S) (FACE,ARMS AND HANDS) ONCE DAILY AS DIRECTED 10/8/20   Provider, Historical         ROS    Objective:   No flowsheet data found.      [INSTRUCTIONS:  \"[x]\" Indicates a positive item  \"[]\" Indicates a negative item  -- DELETE ALL ITEMS NOT EXAMINED]    Constitutional: [x] Appears well-developed and well-nourished [x] No apparent distress      [] Abnormal -     Mental status: [x] Alert and awake  [x] Oriented to person/place/time [x] Able to follow commands    [] Abnormal -     Eyes:   EOM    [x]  Normal    [] Abnormal -   Sclera  [x]  Normal    [] Abnormal -          Discharge [x]  None visible   [] Abnormal -     HENT: [x] Normocephalic, atraumatic  [] Abnormal -   [x] Mouth/Throat: Mucous membranes are moist    External Ears [x] Normal  [] Abnormal -    Neck: [x] No visualized mass [] Abnormal -     Pulmonary/Chest: [x] Respiratory effort normal   [x] No visualized signs of difficulty breathing or respiratory distress        [] Abnormal -      Musculoskeletal:   [x] Normal gait with no signs of ataxia         [x] Normal range of motion of neck        [] Abnormal -     Neurological:        [x] No Facial Asymmetry (Cranial nerve 7 motor function) (limited exam due to video visit)          [x] No gaze palsy        [] Abnormal -          Skin:        [x] No significant exanthematous lesions or discoloration noted on facial skin         [] Abnormal -            Psychiatric: [x] Normal Affect [] Abnormal -        [x] No Hallucinations    Other pertinent observable physical exam findings:-        We discussed the expected course, resolution and complications of the diagnosis(es) in detail. Medication risks, benefits, costs, interactions, and alternatives were discussed as indicated. I advised her to contact the office if her condition worsens, changes or fails to improve as anticipated. She expressed understanding with the diagnosis(es) and plan. Juan Hunter, was evaluated through a synchronous (real-time) audio-video encounter. The patient (or guardian if applicable) is aware that this is a billable service, which includes applicable co-pays. Verbal consent to proceed has been obtained. The visit was conducted pursuant to the emergency declaration under the [address] [place] [address] [facility] authority and the [employer] General Act. Patient identification was verified, and a caregiver was present when appropriate. The patient was located at home in a state where the provider was licensed to provide care.       Felicity Parisi NP

## 2022-04-27 LAB
HBV CORE AB SERPL QL IA: NEGATIVE
HBV SURFACE AB SER QL: REACTIVE

## 2022-05-06 ENCOUNTER — TELEPHONE (OUTPATIENT)
Dept: NEUROLOGY | Age: 32
End: 2022-05-06

## 2022-05-06 NOTE — TELEPHONE ENCOUNTER
Jack colbert from [address] [facility] is calling to let Dr. Tanja Cronin know the patient got her first Ocrevus infusion yesterday. She was premedicated before and during the infusion. Last night she called them after hours saying she didn't feel well and she had a fever. She was told to take tylenol and benadryl and go to the er if she worsened. Jack colbert wants to know if Dr. Tanja Cronin wants to add any other premedications for her next infusion which is scheduled for 5/20/2022.

## 2023-05-23 RX ORDER — CALCIPOTRIENE 50 UG/G
CREAM TOPICAL
COMMUNITY
Start: 2020-10-08 | End: 2023-05-24 | Stop reason: ALTCHOICE

## 2023-05-23 RX ORDER — TRETINOIN 1 MG/G
CREAM TOPICAL
COMMUNITY
Start: 2020-09-10 | End: 2023-05-24 | Stop reason: ALTCHOICE

## 2023-05-24 ENCOUNTER — TELEMEDICINE (OUTPATIENT)
Age: 33
End: 2023-05-24
Payer: MEDICAID

## 2023-05-24 DIAGNOSIS — G35 RELAPSING REMITTING MULTIPLE SCLEROSIS (HCC): Primary | ICD-10-CM

## 2023-05-24 DIAGNOSIS — G35 RELAPSING REMITTING MULTIPLE SCLEROSIS (HCC): ICD-10-CM

## 2023-05-24 PROCEDURE — 99214 OFFICE O/P EST MOD 30 MIN: CPT | Performed by: NURSE PRACTITIONER

## 2023-05-24 RX ORDER — NORETHINDRONE ACETATE AND ETHINYL ESTRADIOL 1.5-30(21)
KIT ORAL
COMMUNITY
Start: 2021-09-13

## 2023-05-24 RX ORDER — CETIRIZINE HYDROCHLORIDE 10 MG/1
TABLET ORAL
COMMUNITY
Start: 2023-04-05

## 2023-06-06 LAB
BASOPHILS # BLD AUTO: 0 X10E3/UL (ref 0–0.2)
BASOPHILS NFR BLD AUTO: 0 %
EOSINOPHIL # BLD AUTO: 0.1 X10E3/UL (ref 0–0.4)
EOSINOPHIL NFR BLD AUTO: 2 %
ERYTHROCYTE [DISTWIDTH] IN BLOOD BY AUTOMATED COUNT: 12 % (ref 11.7–15.4)
HCT VFR BLD AUTO: 36.8 % (ref 34–46.6)
HGB BLD-MCNC: 12.6 G/DL (ref 11.1–15.9)
IMM GRANULOCYTES # BLD AUTO: 0 X10E3/UL (ref 0–0.1)
IMM GRANULOCYTES NFR BLD AUTO: 0 %
LYMPHOCYTES # BLD AUTO: 1.6 X10E3/UL (ref 0.7–3.1)
LYMPHOCYTES NFR BLD AUTO: 28 %
MCH RBC QN AUTO: 30 PG (ref 26.6–33)
MCHC RBC AUTO-ENTMCNC: 34.2 G/DL (ref 31.5–35.7)
MCV RBC AUTO: 88 FL (ref 79–97)
MONOCYTES # BLD AUTO: 0.3 X10E3/UL (ref 0.1–0.9)
MONOCYTES NFR BLD AUTO: 5 %
NEUTROPHILS # BLD AUTO: 3.6 X10E3/UL (ref 1.4–7)
NEUTROPHILS NFR BLD AUTO: 65 %
PLATELET # BLD AUTO: 302 X10E3/UL (ref 150–450)
RBC # BLD AUTO: 4.2 X10E6/UL (ref 3.77–5.28)
WBC # BLD AUTO: 5.6 X10E3/UL (ref 3.4–10.8)

## 2023-06-07 LAB
ALBUMIN SERPL-MCNC: 4 G/DL (ref 3.8–4.8)
ALBUMIN/GLOB SERPL: 1.5 {RATIO} (ref 1.2–2.2)
ALP SERPL-CCNC: 47 IU/L (ref 44–121)
ALT SERPL-CCNC: 12 IU/L (ref 0–32)
AST SERPL-CCNC: 13 IU/L (ref 0–40)
BILIRUB SERPL-MCNC: 0.4 MG/DL (ref 0–1.2)
BUN SERPL-MCNC: 8 MG/DL (ref 6–20)
BUN/CREAT SERPL: 13 (ref 9–23)
CALCIUM SERPL-MCNC: 8.5 MG/DL (ref 8.7–10.2)
CHLORIDE SERPL-SCNC: 103 MMOL/L (ref 96–106)
CO2 SERPL-SCNC: 21 MMOL/L (ref 20–29)
CREAT SERPL-MCNC: 0.6 MG/DL (ref 0.57–1)
EGFRCR SERPLBLD CKD-EPI 2021: 123 ML/MIN/1.73
GLOBULIN SER CALC-MCNC: 2.7 G/DL (ref 1.5–4.5)
GLUCOSE SERPL-MCNC: 91 MG/DL (ref 70–99)
HBV CORE AB SERPL QL IA: NEGATIVE
HBV SURFACE AB SER QL: REACTIVE
POTASSIUM SERPL-SCNC: 4.3 MMOL/L (ref 3.5–5.2)
PROT SERPL-MCNC: 6.7 G/DL (ref 6–8.5)
SODIUM SERPL-SCNC: 137 MMOL/L (ref 134–144)

## 2023-06-09 LAB
IGA SERPL-MCNC: 196 MG/DL (ref 87–352)
IGE SERPL-ACNC: 43 IU/ML (ref 6–495)
IGG SERPL-MCNC: 1201 MG/DL (ref 586–1602)
IGM SERPL-MCNC: 77 MG/DL (ref 26–217)

## 2023-09-19 ENCOUNTER — TELEPHONE (OUTPATIENT)
Age: 33
End: 2023-09-19

## 2023-09-19 NOTE — TELEPHONE ENCOUNTER
Patient stated on 9/19/23 at 4:53 p.m. that she will be keeping her appt on 10/4/23 because she will be changing over to [employer]. Will call us back with information.

## 2023-10-04 ENCOUNTER — OFFICE VISIT (OUTPATIENT)
Age: 33
End: 2023-10-04
Payer: MEDICAID

## 2023-10-04 VITALS
DIASTOLIC BLOOD PRESSURE: 84 MMHG | RESPIRATION RATE: 20 BRPM | SYSTOLIC BLOOD PRESSURE: 138 MMHG | HEART RATE: 70 BPM | OXYGEN SATURATION: 99 %

## 2023-10-04 DIAGNOSIS — G35 RELAPSING REMITTING MULTIPLE SCLEROSIS (HCC): Primary | ICD-10-CM

## 2023-10-04 DIAGNOSIS — F41.9 ANXIETY: ICD-10-CM

## 2023-10-04 PROCEDURE — 99214 OFFICE O/P EST MOD 30 MIN: CPT | Performed by: NURSE PRACTITIONER

## 2023-10-04 RX ORDER — ESCITALOPRAM OXALATE 10 MG/1
10 TABLET ORAL DAILY
Qty: 30 TABLET | Refills: 3 | Status: SHIPPED | OUTPATIENT
Start: 2023-10-04

## 2023-10-04 NOTE — PROGRESS NOTES
[facility]  [address] [place] [address] [address] [place] [phone] Office   [phone] Fax           Date:  10/04/23     Name:  Jayce Whipple  :  1990  MRN:  [id number]     PCP:  Salbador Garcia MD    Chief Complaint   Patient presents with    Follow-up     MS      HISTORY OF PRESENT ILLNESS: Follow up RRMS. She continues with Ocrevus for DMT. Due to wearing off, we tried to move her infusions to an every five month schedule but her insurance denied this. She does continue to have some noted increase in fatigue and focus and concentration is more off, and she will be significantly more emotional. Other than this, she has done well overall. She does mention today that she has been more stressed and has had more difficulty coping lately. She has found that she has less patience and feels like this is making her kids more anxious about their behavior. One example she provides is that one of her boys spilled something and became overly apologetic and asked her not to be angry or upset. She does not like that her boys might feel more on edge themselves because she is having a harder time coping and feels edgier than normal. In the past, she had Ativan but this just made her tired. Recap from [facility]:  Given the wearing off of the Ocrevus at around 5 months, will try to move the Ocrevus infusions to every five months instead of six. Otherwise, she sounds like she has been doing well. She is over due for serial imaging and this was ordered today. Will also reassess immunoglobulins and Hep B status. MRI BRAIN W WO CONTRAST (2023)  Narrative & Impression  Clinical history: Multiple sclerosis  INDICATION:   Multiple sclerosis     COMPARISON: 2022  TECHNIQUE: MR examination of the brain includes axial and sagittal T1 , axial  T2, axial FLAIR, axial gradient echo, axial DWI, coronal T1 .  Pre and

## 2023-10-04 NOTE — PROGRESS NOTES
MS- she thinks she needs some kind of anxiety medication   Has tried ativan before   Thinks she needs something to help her cope, feels like she has been on the edge   Hasn't been on any medication for a little while     Ocrevus- she does fine with the infusions  A month before she does get fatigued   November 16th is when she is due for her next infusion with her Ocrevus

## 2024-01-05 ENCOUNTER — OFFICE VISIT (OUTPATIENT)
Facility: CLINIC | Age: 34
End: 2024-01-05

## 2024-01-05 VITALS
BODY MASS INDEX: 32.87 KG/M2 | WEIGHT: 204.5 LBS | TEMPERATURE: 98.2 F | DIASTOLIC BLOOD PRESSURE: 77 MMHG | HEIGHT: 66 IN | OXYGEN SATURATION: 97 % | RESPIRATION RATE: 16 BRPM | HEART RATE: 90 BPM | SYSTOLIC BLOOD PRESSURE: 118 MMHG

## 2024-01-05 DIAGNOSIS — L30.8 SPONGIOTIC PSORIASIFORM DERMATITIS: ICD-10-CM

## 2024-01-05 DIAGNOSIS — Z00.00 PHYSICAL EXAM: ICD-10-CM

## 2024-01-05 DIAGNOSIS — E78.5 DYSLIPIDEMIA: ICD-10-CM

## 2024-01-05 DIAGNOSIS — E66.01 SEVERE OBESITY (BMI 35.0-35.9 WITH COMORBIDITY) (HCC): ICD-10-CM

## 2024-01-05 DIAGNOSIS — G35 MULTIPLE SCLEROSIS (HCC): Primary | ICD-10-CM

## 2024-01-05 SDOH — ECONOMIC STABILITY: HOUSING INSECURITY
IN THE LAST 12 MONTHS, WAS THERE A TIME WHEN YOU DID NOT HAVE A STEADY PLACE TO SLEEP OR SLEPT IN A SHELTER (INCLUDING NOW)?: NO

## 2024-01-05 SDOH — ECONOMIC STABILITY: FOOD INSECURITY: WITHIN THE PAST 12 MONTHS, THE FOOD YOU BOUGHT JUST DIDN'T LAST AND YOU DIDN'T HAVE MONEY TO GET MORE.: NEVER TRUE

## 2024-01-05 SDOH — ECONOMIC STABILITY: FOOD INSECURITY: WITHIN THE PAST 12 MONTHS, YOU WORRIED THAT YOUR FOOD WOULD RUN OUT BEFORE YOU GOT MONEY TO BUY MORE.: NEVER TRUE

## 2024-01-05 SDOH — ECONOMIC STABILITY: INCOME INSECURITY: HOW HARD IS IT FOR YOU TO PAY FOR THE VERY BASICS LIKE FOOD, HOUSING, MEDICAL CARE, AND HEATING?: NOT HARD AT ALL

## 2024-01-05 ASSESSMENT — ANXIETY QUESTIONNAIRES
6. BECOMING EASILY ANNOYED OR IRRITABLE: 0
5. BEING SO RESTLESS THAT IT IS HARD TO SIT STILL: 0
1. FEELING NERVOUS, ANXIOUS, OR ON EDGE: 0
7. FEELING AFRAID AS IF SOMETHING AWFUL MIGHT HAPPEN: 0
3. WORRYING TOO MUCH ABOUT DIFFERENT THINGS: 0
IF YOU CHECKED OFF ANY PROBLEMS ON THIS QUESTIONNAIRE, HOW DIFFICULT HAVE THESE PROBLEMS MADE IT FOR YOU TO DO YOUR WORK, TAKE CARE OF THINGS AT HOME, OR GET ALONG WITH OTHER PEOPLE: NOT DIFFICULT AT ALL
2. NOT BEING ABLE TO STOP OR CONTROL WORRYING: 0
GAD7 TOTAL SCORE: 0
4. TROUBLE RELAXING: 0

## 2024-01-05 ASSESSMENT — PATIENT HEALTH QUESTIONNAIRE - PHQ9
SUM OF ALL RESPONSES TO PHQ QUESTIONS 1-9: 0
SUM OF ALL RESPONSES TO PHQ9 QUESTIONS 1 & 2: 0
2. FEELING DOWN, DEPRESSED OR HOPELESS: 0
1. LITTLE INTEREST OR PLEASURE IN DOING THINGS: 0
SUM OF ALL RESPONSES TO PHQ QUESTIONS 1-9: 0

## 2024-01-05 NOTE — PROGRESS NOTES
Chief Complaint   Patient presents with    Follow-up     \"Have you been to the ER, urgent care clinic since your last visit?  Hospitalized since your last visit?\"    NO    “Have you seen or consulted any other health care providers outside of [facility] since your last visit?”    NO

## 2024-01-06 PROBLEM — G35 MULTIPLE SCLEROSIS (HCC): Status: ACTIVE | Noted: 2024-01-06

## 2024-01-06 LAB
CHOLEST SERPL-MCNC: 239 MG/DL
HDLC SERPL-MCNC: 72 MG/DL
HDLC SERPL: 3.3 (ref 0–5)
LDLC SERPL CALC-MCNC: 144 MG/DL (ref 0–100)
TRIGL SERPL-MCNC: 115 MG/DL
VLDLC SERPL CALC-MCNC: 23 MG/DL

## 2024-01-09 LAB — APO B SERPL-MCNC: 125 MG/DL

## 2024-01-10 DIAGNOSIS — F41.9 ANXIETY: ICD-10-CM

## 2024-01-10 RX ORDER — ESCITALOPRAM OXALATE 10 MG/1
10 TABLET ORAL DAILY
Qty: 90 TABLET | Refills: 0 | Status: SHIPPED | OUTPATIENT
Start: 2024-01-10

## 2024-02-06 ENCOUNTER — TELEPHONE (OUTPATIENT)
Age: 34
End: 2024-02-06

## 2024-02-06 NOTE — TELEPHONE ENCOUNTER
Patient is requesting a call from the nurse regarding her being in extreme body pain and very fatigue and she has not been sleeping.    Patient stated she sent a message last week on the portal as well.      Jamila stated she has been taking Ibuprofen every other day and it's not helping. Would like to discus  medications.      Patient was last seen VV in December of 2023

## 2024-02-07 NOTE — TELEPHONE ENCOUNTER
Order for 3 days of IV Solumedrol were faxed to infusion center and they will reach out to the patient to get her scheduled.

## 2024-02-07 NOTE — TELEPHONE ENCOUNTER
Patient calling back and stated she left a message in My Chart and one was left on yesterday.    I explained the 48 hr return call policy.    She's requesting another message be sent. She stated having really bad body pain and extreme fatigue.

## 2024-02-09 RX ORDER — SODIUM CHLORIDE 0.9 % (FLUSH) 0.9 %
5-40 SYRINGE (ML) INJECTION PRN
Status: CANCELLED | OUTPATIENT
Start: 2024-02-12

## 2024-02-09 RX ORDER — HEPARIN 100 UNIT/ML
500 SYRINGE INTRAVENOUS PRN
Status: CANCELLED | OUTPATIENT
Start: 2024-02-12

## 2024-02-09 RX ORDER — SODIUM CHLORIDE 9 MG/ML
5-250 INJECTION, SOLUTION INTRAVENOUS PRN
Status: CANCELLED | OUTPATIENT
Start: 2024-02-12

## 2024-02-09 NOTE — TELEPHONE ENCOUNTER
The patient called back and stated the infusion center told her they haven't received the order.    She's requesting a return call.     Please advise.

## 2024-02-09 NOTE — TELEPHONE ENCOUNTER
Returned her call. She stated they did call her today and she is scheduled for Monday- Wednesday of next week.

## 2024-02-12 ENCOUNTER — HOSPITAL ENCOUNTER (OUTPATIENT)
Facility: HOSPITAL | Age: 34
Setting detail: INFUSION SERIES
Discharge: HOME | End: 2024-02-12
Payer: MEDICAID

## 2024-02-12 VITALS
OXYGEN SATURATION: 100 % | TEMPERATURE: 98.6 F | DIASTOLIC BLOOD PRESSURE: 81 MMHG | SYSTOLIC BLOOD PRESSURE: 133 MMHG | HEART RATE: 68 BPM | RESPIRATION RATE: 16 BRPM

## 2024-02-12 DIAGNOSIS — G35 MULTIPLE SCLEROSIS (HCC): Primary | ICD-10-CM

## 2024-02-12 PROCEDURE — 6360000002 HC RX W HCPCS: Performed by: NURSE PRACTITIONER

## 2024-02-12 PROCEDURE — 96365 THER/PROPH/DIAG IV INF INIT: CPT

## 2024-02-12 PROCEDURE — 2580000003 HC RX 258: Performed by: NURSE PRACTITIONER

## 2024-02-12 RX ORDER — SODIUM CHLORIDE 0.9 % (FLUSH) 0.9 %
5-40 SYRINGE (ML) INJECTION PRN
Status: CANCELLED | OUTPATIENT
Start: 2024-02-13

## 2024-02-12 RX ORDER — SODIUM CHLORIDE 9 MG/ML
5-250 INJECTION, SOLUTION INTRAVENOUS PRN
Status: DISCONTINUED | OUTPATIENT
Start: 2024-02-12 | End: 2024-02-13 | Stop reason: HOSPADM

## 2024-02-12 RX ORDER — HEPARIN 100 UNIT/ML
500 SYRINGE INTRAVENOUS PRN
Status: CANCELLED | OUTPATIENT
Start: 2024-02-13

## 2024-02-12 RX ORDER — SODIUM CHLORIDE 9 MG/ML
5-250 INJECTION, SOLUTION INTRAVENOUS PRN
Status: CANCELLED | OUTPATIENT
Start: 2024-02-13

## 2024-02-12 RX ADMIN — SODIUM CHLORIDE 1000 MG: 900 INJECTION INTRAVENOUS at 09:35

## 2024-02-12 RX ADMIN — SODIUM CHLORIDE 25 ML/HR: 9 INJECTION, SOLUTION INTRAVENOUS at 09:31

## 2024-02-12 NOTE — PROGRESS NOTES
hospitals Progress Note  Date: February 12, 2024     Treatment: Solumedrol    Ms. Avalos arrived in no acute distress at 0910.    Patient COVID Screening completed:  Do you have any symptoms of COVID-19? SOB, coughing, fever, or generally not feeling well? NO  Have you been exposed to COVID-19 recently? NO  Have you had any recent contact with family/friend that has a pending COVID test? NO    Assessment was unremarkable with the exception of 9/10 generalized pain. No other concerns voiced. 24G PIV established in L hand with positive blood return noted.     Problem: Safety - Adult  Goal: Free from fall injury  Outcome: Progressing     Problem: Chronic Conditions and Co-morbidities  Goal: Patient's chronic conditions and co-morbidity symptoms are monitored and maintained or improved  Outcome: Progressing    Ms. Avalos's vitals for today's visit.   Patient Vitals for the past 12 hrs:   Temp Pulse Resp BP SpO2   02/12/24 1030 -- 68 16 133/81 --   02/12/24 0915 98.6 °F (37 °C) 73 16 120/80 100 %     Lab results:  No results found for this or any previous visit (from the past 12 hour(s)).    Peripheral IV 02/12/24 Left;Posterior Hand (Active)     Medications given:  Medications Administered         0.9 % sodium chloride infusion Admin Date  02/12/2024 Action  New Bag Dose  25 mL/hr Rate  25 mL/hr Route  IntraVENous Administered By  Jelly Fox RN        methylPREDNISolone (Solu-MEDROL) 1000 mg in 100 mL NS IVPB minibag Admin Date  02/12/2024 Action  New Bag Dose  1,000 mg Rate  100 mL/hr Route  IntraVENous Administered By  Jelly Fox, ALMA          Ms. Avalos tolerated the treatment without complaints. PIV flushed and secured for tomorrow's treatment.    [facility] in [facility] condition at 1045.     Future Appointments   Date Time Provider Department Center   2/13/2024  9:00 AM [facility] INFUSION NURSE 2 [facility]   2/14/2024  2:00 PM [facility] INFUSION NURSE 2

## 2024-02-12 NOTE — DISCHARGE INSTRUCTIONS
tongue, or throat.  Call your doctor at once if you have:  blurred vision, tunnel vision, eye pain, or seeing halos around lights;  shortness of breath (even with mild exertion), swelling, rapid weight gain;  severe depression, changes in personality, unusual thoughts or behavior;  new or unusual pain in an arm or leg or in your back;  severe pain in your upper stomach spreading to your back, nausea and vomiting;  bloody or tarry stools, coughing up blood or vomit that looks like coffee grounds;  a seizure (convulsions); or  low potassium --leg cramps, constipation, irregular heartbeats, fluttering in your chest, increased thirst or urination, numbness or tingling, muscle weakness or limp feeling.  Methylprednisolone can affect growth in children. Tell your doctor if your child is not growing at a normal rate while using this medicine.  Common side effects may include:  weight gain (especially in your face or your upper back and torso);  slow wound healing;  muscle pain or weakness;  thinning skin, increased sweating;  stomach discomfort, bloating;  headache; or  changes in your menstrual periods.  This is not a complete list of side effects and others may occur. Call your doctor for medical advice about side effects. You may report side effects to FDA at 1-364-FDA-7071.  What other drugs will affect methylprednisolone?  Sometimes it is not safe to use certain medications at the same time. Some drugs can affect your blood levels of other drugs you take, which may increase side effects or make the medications less effective.  Many drugs can affect methylprednisolone. This includes prescription and over-the-counter medicines, vitamins, and herbal products. Not all possible interactions are listed here. Tell your doctor about all your current medicines and any medicine you start or stop using.  Where can I get more information?  Your pharmacist can provide more information about methylprednisolone.  Remember, keep this

## 2024-02-13 ENCOUNTER — HOSPITAL ENCOUNTER (OUTPATIENT)
Facility: HOSPITAL | Age: 34
Setting detail: INFUSION SERIES
Discharge: HOME | End: 2024-02-13
Payer: MEDICAID

## 2024-02-13 VITALS
TEMPERATURE: 98.4 F | SYSTOLIC BLOOD PRESSURE: 130 MMHG | RESPIRATION RATE: 18 BRPM | OXYGEN SATURATION: 98 % | HEART RATE: 63 BPM | DIASTOLIC BLOOD PRESSURE: 68 MMHG

## 2024-02-13 DIAGNOSIS — G35 MULTIPLE SCLEROSIS (HCC): Primary | ICD-10-CM

## 2024-02-13 PROCEDURE — 96365 THER/PROPH/DIAG IV INF INIT: CPT

## 2024-02-13 PROCEDURE — 6360000002 HC RX W HCPCS: Performed by: NURSE PRACTITIONER

## 2024-02-13 PROCEDURE — 2580000003 HC RX 258: Performed by: NURSE PRACTITIONER

## 2024-02-13 RX ORDER — SODIUM CHLORIDE 9 MG/ML
5-250 INJECTION, SOLUTION INTRAVENOUS PRN
Status: CANCELLED | OUTPATIENT
Start: 2024-02-14

## 2024-02-13 RX ORDER — SODIUM CHLORIDE 0.9 % (FLUSH) 0.9 %
5-40 SYRINGE (ML) INJECTION PRN
Status: DISCONTINUED | OUTPATIENT
Start: 2024-02-13 | End: 2024-02-14 | Stop reason: HOSPADM

## 2024-02-13 RX ORDER — SODIUM CHLORIDE 0.9 % (FLUSH) 0.9 %
5-40 SYRINGE (ML) INJECTION PRN
Status: CANCELLED | OUTPATIENT
Start: 2024-02-14

## 2024-02-13 RX ORDER — HEPARIN 100 UNIT/ML
500 SYRINGE INTRAVENOUS PRN
OUTPATIENT
Start: 2024-02-14

## 2024-02-13 RX ORDER — SODIUM CHLORIDE 9 MG/ML
5-250 INJECTION, SOLUTION INTRAVENOUS PRN
OUTPATIENT
Start: 2024-02-14

## 2024-02-13 RX ORDER — SODIUM CHLORIDE 9 MG/ML
5-250 INJECTION, SOLUTION INTRAVENOUS PRN
Status: DISCONTINUED | OUTPATIENT
Start: 2024-02-13 | End: 2024-02-14 | Stop reason: HOSPADM

## 2024-02-13 RX ADMIN — SODIUM CHLORIDE 25 ML/HR: 9 INJECTION, SOLUTION INTRAVENOUS at 09:16

## 2024-02-13 RX ADMIN — SODIUM CHLORIDE 1000 MG: 900 INJECTION INTRAVENOUS at 09:24

## 2024-02-13 RX ADMIN — Medication 10 ML: at 09:10

## 2024-02-13 ASSESSMENT — PAIN SCALES - GENERAL: PAINLEVEL_OUTOF10: 7

## 2024-02-13 ASSESSMENT — PAIN DESCRIPTION - DESCRIPTORS: DESCRIPTORS: ACHING

## 2024-02-13 ASSESSMENT — PAIN DESCRIPTION - ORIENTATION: ORIENTATION: RIGHT;LEFT

## 2024-02-13 ASSESSMENT — PAIN DESCRIPTION - LOCATION: LOCATION: LEG

## 2024-02-13 ASSESSMENT — PAIN - FUNCTIONAL ASSESSMENT: PAIN_FUNCTIONAL_ASSESSMENT: ACTIVITIES ARE NOT PREVENTED

## 2024-02-13 NOTE — PROGRESS NOTES
[facility] Progress Note  Date: February 13, 2024     Treatment: Solumedrol 2/3    Ms. Avalos arrived in no acute distress at [id number].    Patient COVID Screening completed:  Do you have any symptoms of COVID-19? SOB, coughing, fever, or generally not feeling well? NO  Have you been exposed to COVID-19 recently? NO  Have you had any recent contact with family/friend that has a pending COVID test? NO    Assessment was unremarkable with no new concerns voiced. 24G PIV established in L hand with positive blood return noted.     Problem: Safety - Adult  Goal: Free from fall injury  Outcome: Progressing     Problem: Chronic Conditions and Co-morbidities  Goal: Patient's chronic conditions and co-morbidity symptoms are monitored and maintained or improved  Outcome: Progressing    Ms. Avalos's vitals for today's visit.   Patient Vitals for the past 12 hrs:   Temp Pulse Resp BP SpO2   02/13/24 1032 -- 63 -- 130/68 --   02/13/24 0904 98.4 °F (36.9 °C) 81 18 127/69 98 %     Lab results:  No results found for this or any previous visit (from the past 12 hour(s)).    Medications given:  Medications Administered         0.9 % sodium chloride infusion Admin Date  02/13/2024 Action  New Bag Dose  25 mL/hr Rate  25 mL/hr Route  IntraVENous Administered By  Jelly Fox RN        methylPREDNISolone (Solu-MEDROL) 1000 mg in 100 mL NS IVPB minibag Admin Date  02/13/2024 Action  New Bag Dose  1,000 mg Rate  100 mL/hr Route  IntraVENous Administered By  Jelly Fox RN        sodium chloride flush 0.9 % injection 5-40 mL Admin Date  02/13/2024 Action  Given Dose  10 mL Rate   Route  IntraVENous Administered By  Jelly Fox, ALMA          Ms. Avalos tolerated the treatment without complaints. PIV flushed, capped and secured for tomorrow's treatment.    [facility] Avalos [facility] in stable condition at 1040.     Future Appointments   Date Time Provider Department Center   2/14/2024

## 2024-02-14 ENCOUNTER — HOSPITAL ENCOUNTER (OUTPATIENT)
Facility: HOSPITAL | Age: 34
Setting detail: INFUSION SERIES
Discharge: HOME | End: 2024-02-14
Payer: MEDICAID

## 2024-02-14 VITALS
TEMPERATURE: 97.7 F | OXYGEN SATURATION: 98 % | SYSTOLIC BLOOD PRESSURE: 129 MMHG | HEART RATE: 78 BPM | DIASTOLIC BLOOD PRESSURE: 68 MMHG | RESPIRATION RATE: 16 BRPM

## 2024-02-14 DIAGNOSIS — G35 MULTIPLE SCLEROSIS (HCC): Primary | ICD-10-CM

## 2024-02-14 PROCEDURE — 96365 THER/PROPH/DIAG IV INF INIT: CPT

## 2024-02-14 PROCEDURE — 6360000002 HC RX W HCPCS: Performed by: NURSE PRACTITIONER

## 2024-02-14 PROCEDURE — 2580000003 HC RX 258: Performed by: NURSE PRACTITIONER

## 2024-02-14 RX ORDER — HEPARIN 100 UNIT/ML
500 SYRINGE INTRAVENOUS PRN
OUTPATIENT
Start: 2024-02-14

## 2024-02-14 RX ORDER — SODIUM CHLORIDE 9 MG/ML
5-250 INJECTION, SOLUTION INTRAVENOUS PRN
Status: DISCONTINUED | OUTPATIENT
Start: 2024-02-14 | End: 2024-02-15 | Stop reason: HOSPADM

## 2024-02-14 RX ORDER — SODIUM CHLORIDE 0.9 % (FLUSH) 0.9 %
5-40 SYRINGE (ML) INJECTION PRN
Status: DISCONTINUED | OUTPATIENT
Start: 2024-02-14 | End: 2024-02-15 | Stop reason: HOSPADM

## 2024-02-14 RX ORDER — SODIUM CHLORIDE 9 MG/ML
5-250 INJECTION, SOLUTION INTRAVENOUS PRN
OUTPATIENT
Start: 2024-02-14

## 2024-02-14 RX ORDER — SODIUM CHLORIDE 9 MG/ML
5-250 INJECTION, SOLUTION INTRAVENOUS PRN
Status: CANCELLED | OUTPATIENT
Start: 2024-02-14

## 2024-02-14 RX ORDER — SODIUM CHLORIDE 0.9 % (FLUSH) 0.9 %
5-40 SYRINGE (ML) INJECTION PRN
OUTPATIENT
Start: 2024-02-14

## 2024-02-14 RX ADMIN — SODIUM CHLORIDE 1000 MG: 900 INJECTION INTRAVENOUS at 13:50

## 2024-02-14 RX ADMIN — SODIUM CHLORIDE 25 ML/HR: 9 INJECTION, SOLUTION INTRAVENOUS at 13:47

## 2024-02-14 ASSESSMENT — PAIN - FUNCTIONAL ASSESSMENT: PAIN_FUNCTIONAL_ASSESSMENT: ACTIVITIES ARE NOT PREVENTED

## 2024-02-14 ASSESSMENT — PAIN DESCRIPTION - DESCRIPTORS: DESCRIPTORS: ACHING

## 2024-02-14 ASSESSMENT — PAIN DESCRIPTION - LOCATION: LOCATION: LEG

## 2024-02-14 ASSESSMENT — PAIN DESCRIPTION - ORIENTATION: ORIENTATION: RIGHT;LEFT

## 2024-02-14 ASSESSMENT — PAIN SCALES - GENERAL: PAINLEVEL_OUTOF10: 4

## 2024-02-14 NOTE — PROGRESS NOTES
[facility] Short Note                       Date: 2024    Name: Jamila Avalos    MRN: [id number]         : 1990    Treatment: Solu-Medrol day 3     [facility] COVID-19 SCREENING      The patient was asked the following questions and answered as documented below:    Do you have any symptoms of COVID-19?  SOB, coughing, fever, or generally not feeling well? NO  Have you been exposed to COVID-19 recently? NO  Have you had any recent contact with family/friend that has a pending COVID test? NO      Follow Up: Proceed with treatment    Ms. Avalos was assessed and education was provided. No changes to report, VSS, IV flushed and patent.     Lines:   Peripheral IV 24 Left;Posterior Hand (Active)   Site Assessment Clean, dry & intact 24 1348   Line Status Flushed;Infusing 24 1348   Phlebitis Assessment No symptoms 24 1348   Infiltration Assessment 0 24 1348   Alcohol Cap Used Yes 24 1348   Dressing Status Clean, dry & intact 24 1348   Dressing Type Transparent 24 1348        Ms. Avalos's vitals were reviewed prior to and after treatment.   Patient Vitals for the past 12 hrs:   Temp Pulse Resp BP SpO2   24 1340 97.7 °F (36.5 °C) 78 16 129/68 98 %         Medications given:  Medications Administered         0.9 % sodium chloride infusion Admin Date  2024 Action  New Bag Dose  25 mL/hr Rate  25 mL/hr Route  IntraVENous Administered By  Elizabeth Angel RN        methylPREDNISolone (Solu-MEDROL) 1000 mg in 100 mL NS IVPB minibag Admin Date  2024 Action  New Bag Dose  1,000 mg Rate  100 mL/hr Route  IntraVENous Administered By  Elizabeth Angel RN            Ms. Avalos tolerated the treatment without complaints. IV flushed and removed.     Ms. Avalos was [facility] in stable condition at 1510.        No future appointments.    JOSS PARRA RN  2024  3:09 PM

## 2024-02-24 RX ORDER — OSELTAMIVIR PHOSPHATE 75 MG/1
75 CAPSULE ORAL 2 TIMES DAILY
Qty: 5 CAPSULE | Refills: 0 | Status: SHIPPED | OUTPATIENT
Start: 2024-02-24 | End: 2024-02-29

## 2024-05-08 DIAGNOSIS — F41.9 ANXIETY: ICD-10-CM

## 2024-05-14 RX ORDER — ESCITALOPRAM OXALATE 10 MG/1
10 TABLET ORAL DAILY
Qty: 90 TABLET | Refills: 0 | Status: SHIPPED | OUTPATIENT
Start: 2024-05-14

## 2024-09-11 ENCOUNTER — TELEPHONE (OUTPATIENT)
Age: 34
End: 2024-09-11

## 2024-10-14 ENCOUNTER — TELEMEDICINE (OUTPATIENT)
Age: 34
End: 2024-10-14
Payer: MEDICAID

## 2024-10-14 DIAGNOSIS — G35 RELAPSING REMITTING MULTIPLE SCLEROSIS (HCC): Primary | ICD-10-CM

## 2024-10-14 DIAGNOSIS — I47.10 PAROXYSMAL SUPRAVENTRICULAR TACHYCARDIA (HCC): ICD-10-CM

## 2024-10-14 DIAGNOSIS — G35 RELAPSING REMITTING MULTIPLE SCLEROSIS (HCC): ICD-10-CM

## 2024-10-14 DIAGNOSIS — F41.9 ANXIETY: ICD-10-CM

## 2024-10-14 PROCEDURE — 99214 OFFICE O/P EST MOD 30 MIN: CPT | Performed by: NURSE PRACTITIONER

## 2024-10-14 RX ORDER — VENLAFAXINE HYDROCHLORIDE 37.5 MG/1
CAPSULE, EXTENDED RELEASE ORAL
Qty: 21 CAPSULE | Refills: 3 | Status: SHIPPED | OUTPATIENT
Start: 2024-10-14

## 2024-10-14 RX ORDER — VENLAFAXINE HYDROCHLORIDE 150 MG/1
150 CAPSULE, EXTENDED RELEASE ORAL DAILY
Qty: 30 CAPSULE | Refills: 1 | Status: SHIPPED | OUTPATIENT
Start: 2024-10-14

## 2024-10-14 NOTE — PROGRESS NOTES
[facility]  [place] [facility]  NEUROLOGY CLINIC   [address]   [place] [address]   [phone] Office   [phone] Fax           Date:  10/14/24     Name:  JAMILA AVALOS  :  1990  MRN:  [id number]     PCP:  Alan Maguire MD    Jamila Avalos is a 34 y.o. female who was seen by synchronous (real-time) audio-video technology on 10/14/2024 for Multiple Sclerosis    Subjective:   Follow up RRMS. She continues with Ocrevus for DMT. Following her last infusion, she did have an SVT event and ended up in the ER.  She has had these issues even prior to her diagnosis of RRMS. It occurs sporadically and she has seen cardiology for it in the past. Due to the sporadic nature of these events, the cardiologist did not recommend any treatment since her heart structure was normal. She has not seen an EP cardiologist. She had a couple of days following this event that she was more short of breath and fatigued. No chest pain but she did have pressure. In terms of the MS, her main issue is fatigue. At her last office visit, she was started on Lexapro due to increased stress and difficulty coping. While this did help, unfortunately, this caused significant weight gain and she did stop taking the medication. Without the Lexapro, she feels about the same as she did prior to starting it. She has not had any new issues with balance, vision, bladder, bowel. No new numbness or tingling. No new weakness. The left upper extremity does have some tingling and weakness which has been present at baseline.      Current Outpatient Medications   Medication Sig    escitalopram (LEXAPRO) 10 MG tablet TAKE 1 TABLET BY MOUTH EVERY DAY    norethindrone-ethinyl estradiol-iron (MICROGESTIN FE1.5/30) 1.5-30 MG-MCG tablet     cetirizine (ZYRTEC) 10 MG tablet TAKE 1 TABLET BY MOUTH EVERY DAY FOR ALLERGY SYMPTOMS    sodium chloride 0.9 % SOLN 500 mL with ocrelizumab

## 2024-10-21 LAB
BASOPHILS # BLD AUTO: 0 X10E3/UL (ref 0–0.2)
BASOPHILS NFR BLD AUTO: 0 %
EOSINOPHIL # BLD AUTO: 0.2 X10E3/UL (ref 0–0.4)
EOSINOPHIL NFR BLD AUTO: 3 %
ERYTHROCYTE [DISTWIDTH] IN BLOOD BY AUTOMATED COUNT: 12 % (ref 11.7–15.4)
HCT VFR BLD AUTO: 39.6 % (ref 34–46.6)
HGB BLD-MCNC: 13.3 G/DL (ref 11.1–15.9)
IMM GRANULOCYTES # BLD AUTO: 0 X10E3/UL (ref 0–0.1)
IMM GRANULOCYTES NFR BLD AUTO: 0 %
LYMPHOCYTES # BLD AUTO: 1.9 X10E3/UL (ref 0.7–3.1)
LYMPHOCYTES NFR BLD AUTO: 27 %
MCH RBC QN AUTO: 30.2 PG (ref 26.6–33)
MCHC RBC AUTO-ENTMCNC: 33.6 G/DL (ref 31.5–35.7)
MCV RBC AUTO: 90 FL (ref 79–97)
MONOCYTES # BLD AUTO: 0.5 X10E3/UL (ref 0.1–0.9)
MONOCYTES NFR BLD AUTO: 7 %
NEUTROPHILS # BLD AUTO: 4.5 X10E3/UL (ref 1.4–7)
NEUTROPHILS NFR BLD AUTO: 63 %
PLATELET # BLD AUTO: 321 X10E3/UL (ref 150–450)
RBC # BLD AUTO: 4.41 X10E6/UL (ref 3.77–5.28)
WBC # BLD AUTO: 7 X10E3/UL (ref 3.4–10.8)

## 2024-10-22 LAB
HBV CORE AB SERPL QL IA: NEGATIVE
HBV SURFACE AB SER QL: REACTIVE
T4 FREE SERPL-MCNC: 1.16 NG/DL (ref 0.82–1.77)
TSH SERPL DL<=0.005 MIU/L-ACNC: 1.15 UIU/ML (ref 0.45–4.5)

## 2024-10-24 LAB
IGA SERPL-MCNC: 146 MG/DL (ref 87–352)
IGE SERPL-ACNC: 28 IU/ML (ref 6–495)
IGG SERPL-MCNC: 1080 MG/DL (ref 586–1602)
IGM SERPL-MCNC: 63 MG/DL (ref 26–217)

## 2024-10-29 LAB
ALBUMIN SERPL-MCNC: 4.1 G/DL (ref 3.9–4.9)
ALP SERPL-CCNC: 61 IU/L (ref 44–121)
ALT SERPL-CCNC: 16 IU/L (ref 0–32)
AST SERPL-CCNC: 19 IU/L (ref 0–40)
BILIRUB SERPL-MCNC: 0.5 MG/DL (ref 0–1.2)
BUN SERPL-MCNC: 10 MG/DL (ref 6–20)
BUN/CREAT SERPL: 15 (ref 9–23)
CALCIUM SERPL-MCNC: 8.8 MG/DL (ref 8.7–10.2)
CHLORIDE SERPL-SCNC: 101 MMOL/L (ref 96–106)
CO2 SERPL-SCNC: 23 MMOL/L (ref 20–29)
CREAT SERPL-MCNC: 0.66 MG/DL (ref 0.57–1)
EGFRCR SERPLBLD CKD-EPI 2021: 119 ML/MIN/1.73
GLOBULIN SER CALC-MCNC: 2.5 G/DL (ref 1.5–4.5)
GLUCOSE SERPL-MCNC: 93 MG/DL (ref 70–99)
POTASSIUM SERPL-SCNC: 4 MMOL/L (ref 3.5–5.2)
PROT SERPL-MCNC: 6.6 G/DL (ref 6–8.5)
SODIUM SERPL-SCNC: 137 MMOL/L (ref 134–144)

## 2024-11-05 ENCOUNTER — HOSPITAL ENCOUNTER (OUTPATIENT)
Age: 34
Discharge: HOME | End: 2024-11-08
Payer: MEDICAID

## 2024-11-05 DIAGNOSIS — G35 RELAPSING REMITTING MULTIPLE SCLEROSIS (HCC): ICD-10-CM

## 2024-11-05 PROCEDURE — 70551 MRI BRAIN STEM W/O DYE: CPT

## 2024-11-06 DIAGNOSIS — F41.9 ANXIETY: ICD-10-CM

## 2024-12-27 ENCOUNTER — TELEMEDICINE (OUTPATIENT)
Age: 34
End: 2024-12-27
Payer: MEDICAID

## 2024-12-27 DIAGNOSIS — F41.9 ANXIETY: ICD-10-CM

## 2024-12-27 DIAGNOSIS — I47.10 PAROXYSMAL SUPRAVENTRICULAR TACHYCARDIA (HCC): ICD-10-CM

## 2024-12-27 DIAGNOSIS — G35 RELAPSING REMITTING MULTIPLE SCLEROSIS (HCC): Primary | ICD-10-CM

## 2024-12-27 PROCEDURE — 99214 OFFICE O/P EST MOD 30 MIN: CPT | Performed by: NURSE PRACTITIONER

## 2024-12-27 RX ORDER — VENLAFAXINE HYDROCHLORIDE 150 MG/1
150 CAPSULE, EXTENDED RELEASE ORAL DAILY
Qty: 30 CAPSULE | Refills: 5 | Status: SHIPPED | OUTPATIENT
Start: 2024-12-27

## 2024-12-27 RX ORDER — VENLAFAXINE HYDROCHLORIDE 150 MG/1
CAPSULE, EXTENDED RELEASE ORAL DAILY
Qty: 90 CAPSULE | Refills: 1 | OUTPATIENT
Start: 2024-12-27

## 2024-12-27 NOTE — PROGRESS NOTES
infarct,  hemorrhage, extra-axial fluid collection, or mass effect. There is no cerebellar  tonsillar herniation. Expected arterial flow-voids are present.    The paranasal sinuses, mastoid air cells, and middle ears are remarkable for  mild mucosal thickening of the maxillary, sphenoid, and ethmoid sinuses. The  orbital contents are within normal limits. No significant osseous or scalp  lesions are identified.    IMPRESSION:    Stable white matter lesions with an appearance and pattern consistent with  demyelinating disease.    Current Outpatient Medications   Medication Sig    venlafaxine (EFFEXOR XR) 150 MG extended release capsule Take 1 capsule by mouth daily    norethindrone-ethinyl estradiol-iron (MICROGESTIN FE1.5/30) 1.5-30 MG-MCG tablet     cetirizine (ZYRTEC) 10 MG tablet TAKE 1 TABLET BY MOUTH EVERY DAY FOR ALLERGY SYMPTOMS    sodium chloride 0.9 % SOLN 500 mL with ocrelizumab 300 MG/10ML SOLN 600 mg Infuse 600 mg intravenously every 6 months     No current facility-administered medications for this visit.     No Known Allergies   Past Medical History:   Diagnosis Date    ADD (attention deficit disorder)     Allergic rhinitis     Anxiety     Chronic UTI     Hearing loss     dx as a child - never treated.     SVT (supraventricular tachycardia) ([facility]) 2019     Past Surgical History:   Procedure Laterality Date    SKIN GRAFT Left     left eye      reports that she has never smoked. She has never used smokeless tobacco. She reports current alcohol use. She reports that she does not use drugs.  family history includes Drug Abuse in her mother; Stroke (age of onset: 63) in her father. She was adopted.     Review of Systems      Objective:          No data to display                 General:  Well defined, nourished, and groomed individual in no acute distress.    Psych:  Good mood and bright affect    NEUROLOGICAL EXAMINATION:     Mental Status:   Alert and oriented to person, place, and time with recent and

## 2025-01-10 ENCOUNTER — OFFICE VISIT (OUTPATIENT)
Facility: CLINIC | Age: 35
End: 2025-01-10
Payer: MEDICAID

## 2025-01-10 VITALS
HEART RATE: 78 BPM | BODY MASS INDEX: 36.58 KG/M2 | SYSTOLIC BLOOD PRESSURE: 118 MMHG | DIASTOLIC BLOOD PRESSURE: 82 MMHG | OXYGEN SATURATION: 97 % | RESPIRATION RATE: 16 BRPM | WEIGHT: 227.6 LBS | HEIGHT: 66 IN | TEMPERATURE: 97.9 F

## 2025-01-10 DIAGNOSIS — L30.8 SPONGIOTIC PSORIASIFORM DERMATITIS: ICD-10-CM

## 2025-01-10 DIAGNOSIS — E66.01 SEVERE OBESITY (BMI 35.0-35.9 WITH COMORBIDITY): ICD-10-CM

## 2025-01-10 DIAGNOSIS — I47.10 PAROXYSMAL SVT (SUPRAVENTRICULAR TACHYCARDIA) (HCC): ICD-10-CM

## 2025-01-10 DIAGNOSIS — F41.9 ANXIETY AND DEPRESSION: ICD-10-CM

## 2025-01-10 DIAGNOSIS — G35 MULTIPLE SCLEROSIS (HCC): Primary | ICD-10-CM

## 2025-01-10 DIAGNOSIS — F32.A ANXIETY AND DEPRESSION: ICD-10-CM

## 2025-01-10 PROCEDURE — 99214 OFFICE O/P EST MOD 30 MIN: CPT | Performed by: INTERNAL MEDICINE

## 2025-01-10 SDOH — ECONOMIC STABILITY: FOOD INSECURITY: WITHIN THE PAST 12 MONTHS, THE FOOD YOU BOUGHT JUST DIDN'T LAST AND YOU DIDN'T HAVE MONEY TO GET MORE.: NEVER TRUE

## 2025-01-10 SDOH — ECONOMIC STABILITY: FOOD INSECURITY: WITHIN THE PAST 12 MONTHS, YOU WORRIED THAT YOUR FOOD WOULD RUN OUT BEFORE YOU GOT MONEY TO BUY MORE.: NEVER TRUE

## 2025-01-10 ASSESSMENT — PATIENT HEALTH QUESTIONNAIRE - PHQ9
SUM OF ALL RESPONSES TO PHQ QUESTIONS 1-9: 0
1. LITTLE INTEREST OR PLEASURE IN DOING THINGS: NOT AT ALL
SUM OF ALL RESPONSES TO PHQ9 QUESTIONS 1 & 2: 0
2. FEELING DOWN, DEPRESSED OR HOPELESS: NOT AT ALL
SUM OF ALL RESPONSES TO PHQ QUESTIONS 1-9: 0

## 2025-01-10 ASSESSMENT — ANXIETY QUESTIONNAIRES
7. FEELING AFRAID AS IF SOMETHING AWFUL MIGHT HAPPEN: NOT AT ALL
2. NOT BEING ABLE TO STOP OR CONTROL WORRYING: NOT AT ALL
6. BECOMING EASILY ANNOYED OR IRRITABLE: NOT AT ALL
IF YOU CHECKED OFF ANY PROBLEMS ON THIS QUESTIONNAIRE, HOW DIFFICULT HAVE THESE PROBLEMS MADE IT FOR YOU TO DO YOUR WORK, TAKE CARE OF THINGS AT HOME, OR GET ALONG WITH OTHER PEOPLE: NOT DIFFICULT AT ALL
3. WORRYING TOO MUCH ABOUT DIFFERENT THINGS: NOT AT ALL
5. BEING SO RESTLESS THAT IT IS HARD TO SIT STILL: NOT AT ALL
4. TROUBLE RELAXING: NOT AT ALL
1. FEELING NERVOUS, ANXIOUS, OR ON EDGE: NOT AT ALL
GAD7 TOTAL SCORE: 0

## 2025-01-10 NOTE — PROGRESS NOTES
Chief Complaint   Patient presents with    Follow-up     Patient states \" she is present for a follow-up and still having sinus drainage and constantly blowing her nose.\"    \"Have you been to the ER, urgent care clinic since your last visit?  Hospitalized since your last visit?\"    YES - When: approximately 2 months ago.  Where and Why: [facility].    “Have you seen or consulted any other health care providers outside our system since your last visit?”    NO

## 2025-01-10 NOTE — PROGRESS NOTES
[facility]  [facility]   [address]  [facility] [address]  Phone:  [phone]  Fax: [phone]       Chief Complaint   Patient presents with    Follow-up   .      SUBJECTIVE:      History of Present Illness  The patient is a 34-year-old female who presents for evaluation of rhinitis, supraventricular tachycardia, multiple sclerosis, and weight management.    She reports persistent fatigue and tiredness associated with her multiple sclerosis (MS) diagnosis. She was diagnosed with MS in May 2022 and is currently receiving Ocrevus infusions every 24 weeks, which she finds effective. She had a steroid infusion between her last two Ocrevus infusions, which she did not tolerate well. She reports no physical limitations due to her condition. She has been on Effexor for the past 6 weeks, which has been effective without causing weight gain. Prior to this, she was on Lexapro, which led to significant weight gain.    She has a history of supraventricular tachycardia (SVT), with episodes occurring approximately once a year. During these episodes, she experiences chest tightness and shortness of breath. In November 2024, she sought emergency care at [facility] due to a heart rate exceeding 180 beats per minute, where she was administered medication to reduce her heart rate. She was evaluated by a cardiologist 2 to 3 years ago, who found no structural abnormalities. She is not currently on any medication for SVT.    She expresses concern about her sinuses. She was diagnosed with a sinus infection at an urgent care visit in the spring, which resolved with antibiotics. However, she continues to experience postnasal drip, particularly in the morning, and describes a raw sensation in her nose when blowing it. She also reports head pressure and occasional colored mucus, although it is predominantly clear. She experiences intermittent sneezing and frequent tearing in one eye, which has had its

## 2025-01-11 RX ORDER — AZELASTINE HYDROCHLORIDE, FLUTICASONE PROPIONATE 137; 50 UG/1; UG/1
SPRAY, METERED NASAL
Qty: 23 G | Refills: 11 | Status: SHIPPED | OUTPATIENT
Start: 2025-01-11

## 2025-02-27 ENCOUNTER — OFFICE VISIT (OUTPATIENT)
Age: 35
End: 2025-02-27

## 2025-02-27 VITALS
WEIGHT: 236.4 LBS | OXYGEN SATURATION: 96 % | DIASTOLIC BLOOD PRESSURE: 56 MMHG | HEIGHT: 66 IN | TEMPERATURE: 98.2 F | RESPIRATION RATE: 18 BRPM | BODY MASS INDEX: 37.99 KG/M2 | HEART RATE: 72 BPM | SYSTOLIC BLOOD PRESSURE: 114 MMHG

## 2025-02-27 DIAGNOSIS — R35.0 URINARY FREQUENCY: ICD-10-CM

## 2025-02-27 DIAGNOSIS — N39.0 UTI (URINARY TRACT INFECTION), UNCOMPLICATED: Primary | ICD-10-CM

## 2025-02-27 LAB
BILIRUBIN, URINE, POC: NEGATIVE
BLOOD URINE, POC: ABNORMAL
GLUCOSE URINE, POC: NEGATIVE
KETONES, URINE, POC: NEGATIVE
LEUKOCYTE ESTERASE, URINE, POC: ABNORMAL
NITRITE, URINE, POC: NEGATIVE
PH, URINE, POC: 7.5 (ref 4.6–8)
PROTEIN,URINE, POC: ABNORMAL
SPECIFIC GRAVITY, URINE, POC: 1.02 (ref 1–1.03)
URINALYSIS CLARITY, POC: ABNORMAL
URINALYSIS COLOR, POC: YELLOW
UROBILINOGEN, POC: ABNORMAL

## 2025-02-27 RX ORDER — PHENAZOPYRIDINE HYDROCHLORIDE 100 MG/1
100 TABLET, FILM COATED ORAL 3 TIMES DAILY PRN
Qty: 9 TABLET | Refills: 0 | Status: SHIPPED | OUTPATIENT
Start: 2025-02-27 | End: 2025-03-02

## 2025-02-27 RX ORDER — NITROFURANTOIN 25; 75 MG/1; MG/1
100 CAPSULE ORAL 2 TIMES DAILY
Qty: 10 CAPSULE | Refills: 0 | Status: SHIPPED | OUTPATIENT
Start: 2025-02-27 | End: 2025-03-04

## 2025-02-27 NOTE — PROGRESS NOTES
Patient Name: Jamila Avalos   YOB: 1990   Patient Status: New patient,   Chief Complaint: Urinary Tract Infection (Urinary urgency, frequency, no pain when urinating, lower abdomen soreness )      ____________________________________________________________________________________________    External Records Reviewed: None    Limitation to History: None    Outside Historian: None    SUBJECTIVE/OBJECTIVE:  Jamila Avalos is a 34 y.o. female presents with complaint of urinary frequency and urgency and pelvic pressure with what she thinks are bladder spasms.  Symptoms began 2 day(s) ago and show no change since onset.  The patient denies fever, dysuria, blood in urine, GI symptoms or flank pain.  Patient reports taking nothing for symptoms.  She reports history of recurrent UTIs and bladder spasms felt to be due to her MS.  She reports last infection was over a year ago. No other acute symptoms reported at this time.          PAST MEDICAL HISTORY:   Medical: Pt  has a past medical history of ADD (attention deficit disorder), Allergic rhinitis, Anxiety, Chronic UTI, Hearing loss, and SVT (supraventricular tachycardia) (2019).  Surgical: Pt  has a past surgical history that includes Skin graft (Left).  Family: Pt family history includes Drug Abuse in her mother; Stroke (age of onset: 63) in her father. She was adopted.  Social: Pt   Social History     Socioeconomic History    Marital status: Single     Spouse name: Not on file    Number of children: 2    Years of education: 16+    Highest education level: Bachelor's degree (e.g., BA, AB, BS)   Occupational History    Occupation: [employer]   Tobacco Use    Smoking status: Never    Smokeless tobacco: Never   Vaping Use    Vaping status: Never Used   Substance and Sexual Activity    Alcohol use: Yes    Drug use: No    Sexual activity: Yes     Partners: Male     Birth control/protection: Pill   Other Topics Concern    Not on file

## 2025-02-27 NOTE — PATIENT INSTRUCTIONS
Take your antibiotics as directed. Do not stop taking them just because you feel better. You need to take the full course of antibiotics.  Drink extra water and other fluids for the next day or two. This will help make the urine less concentrated and help wash out the bacteria that are causing the infection. (If you have kidney, heart, or liver disease and have to limit fluids, talk with your doctor before you increase the amount of fluids you drink.)  Avoid drinks that are carbonated or have caffeine.   Urinate often. Try to empty your bladder each time.  If needed, take an over-the-counter pain medicine, such as acetaminophen (Tylenol), ibuprofen (Advil, Motrin), or naproxen (Aleve). Read and follow all instructions on the label, can take together for more severe pain or alternate every 4 hours.  To relieve pain, take a hot bath or lay a heating pad set on low over your lower belly or genital area. Never go to sleep with a heating pad in place.  To prevent UTIs  Drink plenty of water each day. This helps you urinate often, which clears bacteria from your system. (If you have kidney, heart, or liver disease and have to limit fluids, talk with your doctor before you increase the amount of fluids you drink.)  Urinate when you need to - avoid holding urine for long periods of time.  If you are sexually active, urinate right after you have sex.  Change sanitary pads often.  Avoid douches, bubble baths, feminine hygiene sprays, and other feminine hygiene products that have deodorants.  After you use the toilet, wipe from front to back.  Return to clinic if no improvement after 3 full days on antibiotic, new onset flank pain or fever.  Report to ER for high fever, severe flank or abdominal pain, new or significant worsening in symptoms.  Follow up with PCP in 5-7 days or for persistent or recurrent symptoms.

## 2025-03-04 ENCOUNTER — OFFICE VISIT (OUTPATIENT)
Age: 35
End: 2025-03-04
Payer: MEDICAID

## 2025-03-04 VITALS
OXYGEN SATURATION: 97 % | HEART RATE: 88 BPM | DIASTOLIC BLOOD PRESSURE: 80 MMHG | HEIGHT: 66 IN | WEIGHT: 230.2 LBS | SYSTOLIC BLOOD PRESSURE: 115 MMHG | BODY MASS INDEX: 37 KG/M2

## 2025-03-04 DIAGNOSIS — R00.2 PALPITATIONS: ICD-10-CM

## 2025-03-04 DIAGNOSIS — I47.10 PSVT (PAROXYSMAL SUPRAVENTRICULAR TACHYCARDIA): Primary | ICD-10-CM

## 2025-03-04 PROCEDURE — 93010 ELECTROCARDIOGRAM REPORT: CPT | Performed by: INTERNAL MEDICINE

## 2025-03-04 PROCEDURE — 93005 ELECTROCARDIOGRAM TRACING: CPT | Performed by: INTERNAL MEDICINE

## 2025-03-04 PROCEDURE — 99204 OFFICE O/P NEW MOD 45 MIN: CPT | Performed by: INTERNAL MEDICINE

## 2025-03-04 RX ORDER — OCRELIZUMAB 300 MG/10ML
INJECTION INTRAVENOUS
COMMUNITY

## 2025-03-04 RX ORDER — METOPROLOL SUCCINATE 50 MG/1
25 TABLET, EXTENDED RELEASE ORAL DAILY
Qty: 30 TABLET | Refills: 3 | Status: SHIPPED | OUTPATIENT
Start: 2025-03-04

## 2025-03-04 RX ORDER — LEVONORGESTREL 52 MG/1
INTRAUTERINE DEVICE INTRAUTERINE
COMMUNITY

## 2025-03-04 ASSESSMENT — PATIENT HEALTH QUESTIONNAIRE - PHQ9
1. LITTLE INTEREST OR PLEASURE IN DOING THINGS: NOT AT ALL
2. FEELING DOWN, DEPRESSED OR HOPELESS: NOT AT ALL
SUM OF ALL RESPONSES TO PHQ QUESTIONS 1-9: 0

## 2025-03-04 NOTE — PROGRESS NOTES
[facility] CARDIOLOGY                    Cardiac EP Office Care Note     [x]Initial Encounter     []Follow-up    Patient Name: Jamila Avalos - :1990 - MRN:[id number]  Primary Cardiologist: None  Consulting Cardiologist: Benton Parker MD     Reason for encounter: PSVT    HPI:       Jamila Avalos is a 34 y.o. female with PMH significant for PSVT and had seen Dr Bush in   She has once a year  Adenosine broke it before  She has RBBB  Echo was normal   Dr Bush said in  she did not like cardizem  She did not remember     She takes med for MS    10/10/2024 at [facility] ER  On EMS arrival, pt was found to be tachycardic in 180's with rhythm suggestive of SVT. Pt has a hx of 4-5 episodes of SVT previously over the past approximately 5 years. Each time has been able to be chemically cardioverted with adenosine; pt has never had to be electrically cardioverted before. EMS gave 6 mg and 12 mg adenosine with no success en route to the ED. Pt also received 324 mg ASA with EMS. Pt has a hx of MS and had an infusion       Assessment and Plan      Diagnosis Orders   1. PSVT (paroxysmal supraventricular tachycardia)  EKG 12 Lead      2. Palpitations          ECG Sinus Rhythm   -Right bundle branch block.    PSVT with RBBB, adenosine dependent by history, likely AVNRT  She agrees with toprol 50 mg qd  She does not want ablation yet  We discussed risks and benefits below    Bp is normal, should be able to tolerate toprol  If it fails she will call back for EP study and ablation    Risks include but are not limited to bleeding in the groin, infection in the groin and/or heart valves, fistula between the groin arteries and veins, valvular damage, diaphragmatic paralysis, aortic dissection, heart attack, stroke, blood clot in the leg, pulmonary embolism, lung collapse (pneumothorax or hemothorax), heart collapse (pericardial tamponade), av block, pacer, death.      Future Appointments   Date Time Provider

## 2025-04-29 ENCOUNTER — OFFICE VISIT (OUTPATIENT)
Age: 35
End: 2025-04-29

## 2025-04-29 VITALS
HEART RATE: 85 BPM | WEIGHT: 230 LBS | BODY MASS INDEX: 37.14 KG/M2 | OXYGEN SATURATION: 96 % | DIASTOLIC BLOOD PRESSURE: 70 MMHG | SYSTOLIC BLOOD PRESSURE: 107 MMHG | RESPIRATION RATE: 16 BRPM | TEMPERATURE: 98.2 F

## 2025-04-29 DIAGNOSIS — H10.12 ACUTE ATOPIC CONJUNCTIVITIS OF LEFT EYE: Primary | ICD-10-CM

## 2025-04-29 DIAGNOSIS — H00.015 HORDEOLUM EXTERNUM OF LEFT LOWER EYELID: ICD-10-CM

## 2025-04-29 RX ORDER — OLOPATADINE HYDROCHLORIDE 2 MG/ML
1 SOLUTION/ DROPS OPHTHALMIC DAILY
Qty: 2.5 ML | Refills: 0 | Status: SHIPPED | OUTPATIENT
Start: 2025-04-29

## 2025-04-29 RX ORDER — ERYTHROMYCIN 5 MG/G
OINTMENT OPHTHALMIC
Qty: 3.5 G | Refills: 0 | Status: SHIPPED | OUTPATIENT
Start: 2025-04-29 | End: 2025-05-09

## 2025-04-29 NOTE — PROGRESS NOTES
headache, fever, chills, shortness of breath, chest pain, or other systemic complaints or concerns at this time.     Conjunctivitis (Eye infection, painful, drainage, crusty  )      No results found for any visits on 04/29/25.    No results found for this visit on 04/29/25.    Review of Systems    ROS reviewed and negative except as stated in the HPI   Physical Exam  Vitals reviewed.   Constitutional:       General: She is not in acute distress.     Appearance: Normal appearance. She is not ill-appearing or toxic-appearing.   HENT:      Head: Normocephalic and atraumatic.   Eyes:      Extraocular Movements: Extraocular movements intact.      Pupils: Pupils are equal, round, and reactive to light.      Comments: Left conjunctiva is irritated with no evidence of purulent discharge.  There is swelling and tenderness to the left lower eyelid.   Cardiovascular:      Rate and Rhythm: Normal rate and regular rhythm.   Pulmonary:      Effort: Pulmonary effort is normal. No respiratory distress.   Abdominal:      General: There is no distension.      Palpations: There is no mass.      Tenderness: There is no abdominal tenderness.   Skin:     General: Skin is warm.      Comments: No rash appreciated on exposed skin   Neurological:      General: No focal deficit present.      Mental Status: She is alert and oriented to person, place, and time.        Vitals:    04/29/25 1309   Weight: 104.3 kg (230 lb)        No Known Allergies    Prior to Admission medications    Medication Sig Start Date End Date Taking? Authorizing Provider   ocrelizumab (OCREVUS) 300 MG/10ML SOLN injection Inject by intravenous route.   Yes ProviderReed MD   levonorgestrel (LILETTA, 52 MG,) 20.1 MCG/DAY IUD IUD 52 mg by IntraUTERine route   Yes ProviderReed MD   metoprolol succinate (TOPROL XL) 50 MG extended release tablet Take 0.5 tablets by mouth daily 3/4/25  Yes Benton Parker MD   Azelastine-Fluticasone (DYMISTA) 137-50 MCG/ACT SUSP

## 2025-04-29 NOTE — PATIENT INSTRUCTIONS
Exam and history consistent with a stye of the left lower eyelid and atopic conjunctivitis of the left eye.  - Apply warm compress for 15 minutes at a time at least 3 times a day.  If itching and irritation worsens over the next couple days, please switch to cool compresses instead  - Erythromycin ointment twice a day to affected eye  - Pataday eyedrops once a day.  Please space at least 5 minutes from erythromycin use  -Please drink at least 64 ounces of fluid a day  - Please follow-up with your PCP in the next 2 to 3 weeks    If symptoms persist or worsen, please contact your PCP and or return to urgent care.

## 2025-07-10 ENCOUNTER — OFFICE VISIT (OUTPATIENT)
Age: 35
End: 2025-07-10
Payer: MEDICAID

## 2025-07-10 VITALS
RESPIRATION RATE: 18 BRPM | HEIGHT: 66 IN | BODY MASS INDEX: 35.84 KG/M2 | WEIGHT: 223 LBS | SYSTOLIC BLOOD PRESSURE: 112 MMHG | OXYGEN SATURATION: 100 % | DIASTOLIC BLOOD PRESSURE: 72 MMHG | HEART RATE: 84 BPM

## 2025-07-10 DIAGNOSIS — G35 RELAPSING REMITTING MULTIPLE SCLEROSIS (HCC): Primary | ICD-10-CM

## 2025-07-10 DIAGNOSIS — F41.9 ANXIETY: ICD-10-CM

## 2025-07-10 PROCEDURE — 99214 OFFICE O/P EST MOD 30 MIN: CPT | Performed by: NURSE PRACTITIONER

## 2025-07-10 RX ORDER — PREDNISONE 10 MG/1
TABLET ORAL
Qty: 63 TABLET | Refills: 0 | Status: SHIPPED | OUTPATIENT
Start: 2025-07-10

## 2025-07-18 ENCOUNTER — OFFICE VISIT (OUTPATIENT)
Facility: CLINIC | Age: 35
End: 2025-07-18
Payer: MEDICAID

## 2025-07-18 VITALS
BODY MASS INDEX: 44.33 KG/M2 | OXYGEN SATURATION: 97 % | RESPIRATION RATE: 16 BRPM | DIASTOLIC BLOOD PRESSURE: 70 MMHG | SYSTOLIC BLOOD PRESSURE: 103 MMHG | HEART RATE: 73 BPM | WEIGHT: 225.8 LBS | TEMPERATURE: 97.8 F | HEIGHT: 60 IN

## 2025-07-18 DIAGNOSIS — E66.01 MORBID OBESITY (HCC): ICD-10-CM

## 2025-07-18 DIAGNOSIS — G35 MULTIPLE SCLEROSIS (HCC): Primary | ICD-10-CM

## 2025-07-18 DIAGNOSIS — F41.9 ANXIETY AND DEPRESSION: ICD-10-CM

## 2025-07-18 DIAGNOSIS — R00.2 PALPITATIONS: ICD-10-CM

## 2025-07-18 DIAGNOSIS — L30.8 SPONGIOTIC PSORIASIFORM DERMATITIS: ICD-10-CM

## 2025-07-18 DIAGNOSIS — F32.A ANXIETY AND DEPRESSION: ICD-10-CM

## 2025-07-18 PROCEDURE — 99214 OFFICE O/P EST MOD 30 MIN: CPT | Performed by: INTERNAL MEDICINE

## 2025-07-18 SDOH — ECONOMIC STABILITY: FOOD INSECURITY: WITHIN THE PAST 12 MONTHS, YOU WORRIED THAT YOUR FOOD WOULD RUN OUT BEFORE YOU GOT MONEY TO BUY MORE.: NEVER TRUE

## 2025-07-18 SDOH — ECONOMIC STABILITY: FOOD INSECURITY: WITHIN THE PAST 12 MONTHS, THE FOOD YOU BOUGHT JUST DIDN'T LAST AND YOU DIDN'T HAVE MONEY TO GET MORE.: NEVER TRUE

## 2025-07-18 ASSESSMENT — PATIENT HEALTH QUESTIONNAIRE - PHQ9
4. FEELING TIRED OR HAVING LITTLE ENERGY: NOT AT ALL
SUM OF ALL RESPONSES TO PHQ QUESTIONS 1-9: 0
5. POOR APPETITE OR OVEREATING: NOT AT ALL
SUM OF ALL RESPONSES TO PHQ QUESTIONS 1-9: 0
2. FEELING DOWN, DEPRESSED OR HOPELESS: NOT AT ALL
6. FEELING BAD ABOUT YOURSELF - OR THAT YOU ARE A FAILURE OR HAVE LET YOURSELF OR YOUR FAMILY DOWN: NOT AT ALL
8. MOVING OR SPEAKING SO SLOWLY THAT OTHER PEOPLE COULD HAVE NOTICED. OR THE OPPOSITE, BEING SO FIGETY OR RESTLESS THAT YOU HAVE BEEN MOVING AROUND A LOT MORE THAN USUAL: NOT AT ALL
1. LITTLE INTEREST OR PLEASURE IN DOING THINGS: NOT AT ALL
3. TROUBLE FALLING OR STAYING ASLEEP: NOT AT ALL
10. IF YOU CHECKED OFF ANY PROBLEMS, HOW DIFFICULT HAVE THESE PROBLEMS MADE IT FOR YOU TO DO YOUR WORK, TAKE CARE OF THINGS AT HOME, OR GET ALONG WITH OTHER PEOPLE: NOT DIFFICULT AT ALL
9. THOUGHTS THAT YOU WOULD BE BETTER OFF DEAD, OR OF HURTING YOURSELF: NOT AT ALL
SUM OF ALL RESPONSES TO PHQ QUESTIONS 1-9: 0
7. TROUBLE CONCENTRATING ON THINGS, SUCH AS READING THE NEWSPAPER OR WATCHING TELEVISION: NOT AT ALL
SUM OF ALL RESPONSES TO PHQ QUESTIONS 1-9: 0

## 2025-08-09 PROBLEM — E66.01 MORBID OBESITY (HCC): Status: ACTIVE | Noted: 2021-12-27

## 2025-08-09 PROBLEM — R00.2 PALPITATIONS: Status: ACTIVE | Noted: 2025-08-09

## 2025-08-27 ENCOUNTER — TELEPHONE (OUTPATIENT)
Age: 35
End: 2025-08-27